# Patient Record
Sex: FEMALE | Race: WHITE
[De-identification: names, ages, dates, MRNs, and addresses within clinical notes are randomized per-mention and may not be internally consistent; named-entity substitution may affect disease eponyms.]

---

## 2018-06-04 ENCOUNTER — HOSPITAL ENCOUNTER (OUTPATIENT)
Dept: HOSPITAL 62 - SP | Age: 63
End: 2018-06-04
Attending: PODIATRIST
Payer: COMMERCIAL

## 2018-06-04 DIAGNOSIS — L97.512: Primary | ICD-10-CM

## 2018-06-04 PROCEDURE — 93922 UPR/L XTREMITY ART 2 LEVELS: CPT

## 2018-06-04 PROCEDURE — 93925 LOWER EXTREMITY STUDY: CPT

## 2019-03-21 ENCOUNTER — HOSPITAL ENCOUNTER (OUTPATIENT)
Dept: HOSPITAL 62 - WC | Age: 64
End: 2019-03-21
Attending: PODIATRIST
Payer: COMMERCIAL

## 2019-03-21 DIAGNOSIS — L97.512: Primary | ICD-10-CM

## 2019-03-21 NOTE — RADIOLOGY REPORT (SQ)
EXAM DESCRIPTION:  FOOT RIGHT COMPLETE



COMPLETED DATE/TIME:  3/21/2019 11:02 am



REASON FOR STUDY:  NON-PRS CHRONIC ULCER OTH PRT RIGHT FOOT W FAT LAYER EXPOSED L97.512  NON-PRS 
BRANDON ULCER OTH PRT RIGHT FOOT W FAT LAYER



COMPARISON:  None.



NUMBER OF VIEWS:  Three views.



TECHNIQUE:  AP, lateral and oblique  radiographic images acquired of the right foot.



LIMITATIONS:  None.



FINDINGS:  MINERALIZATION: Normal.

BONES: No lytic defects to suggest osteomyelitis.  No acute findings.

JOINTS: No effusions.

SOFT TISSUES: No radiopaque foreign body.  No air within the soft tissue.

OTHER: No other significant finding.



IMPRESSION:  No significant soft tissue or bony abnormalities associated with the chronic ulcer.



TECHNICAL DOCUMENTATION:  JOB ID:  [id number]

 2011 [employer]- All Rights Reserved



Reading location - IP/workstation name: [email or web address]

## 2019-10-10 ENCOUNTER — HOSPITAL ENCOUNTER (INPATIENT)
Dept: HOSPITAL 62 - ER | Age: 64
LOS: 7 days | Discharge: ANOTHER HOSPITAL | DRG: 255 | End: 2019-10-17
Attending: INTERNAL MEDICINE | Admitting: INTERNAL MEDICINE
Payer: COMMERCIAL

## 2019-10-10 ENCOUNTER — HOSPITAL ENCOUNTER (OUTPATIENT)
Dept: HOSPITAL 62 - OD | Age: 64
End: 2019-10-10
Attending: PODIATRIST
Payer: COMMERCIAL

## 2019-10-10 DIAGNOSIS — I35.0: ICD-10-CM

## 2019-10-10 DIAGNOSIS — Z79.82: ICD-10-CM

## 2019-10-10 DIAGNOSIS — E11.52: Primary | ICD-10-CM

## 2019-10-10 DIAGNOSIS — I45.2: ICD-10-CM

## 2019-10-10 DIAGNOSIS — Z79.01: ICD-10-CM

## 2019-10-10 DIAGNOSIS — M77.32: Primary | ICD-10-CM

## 2019-10-10 DIAGNOSIS — Z79.899: ICD-10-CM

## 2019-10-10 DIAGNOSIS — B96.4: ICD-10-CM

## 2019-10-10 DIAGNOSIS — E78.00: ICD-10-CM

## 2019-10-10 DIAGNOSIS — Z88.8: ICD-10-CM

## 2019-10-10 DIAGNOSIS — A48.0: ICD-10-CM

## 2019-10-10 DIAGNOSIS — E66.9: ICD-10-CM

## 2019-10-10 DIAGNOSIS — I10: ICD-10-CM

## 2019-10-10 DIAGNOSIS — E78.5: ICD-10-CM

## 2019-10-10 DIAGNOSIS — B95.1: ICD-10-CM

## 2019-10-10 DIAGNOSIS — Z79.4: ICD-10-CM

## 2019-10-10 DIAGNOSIS — L97.522: ICD-10-CM

## 2019-10-10 DIAGNOSIS — I48.19: ICD-10-CM

## 2019-10-10 LAB
ABSOLUTE LYMPHOCYTES# (MANUAL): 0.4 10^3/UL (ref 0.5–4.7)
ABSOLUTE MONOCYTES # (MANUAL): 0.8 10^3/UL (ref 0.1–1.4)
ADD MANUAL DIFF: YES
ALBUMIN SERPL-MCNC: 4.3 G/DL (ref 3.5–5)
ALP SERPL-CCNC: 91 U/L (ref 38–126)
ANION GAP SERPL CALC-SCNC: 16 MMOL/L (ref 5–19)
ANISOCYTOSIS BLD QL SMEAR: (no result)
APPEARANCE UR: (no result)
APTT PPP: YELLOW S
AST SERPL-CCNC: 17 U/L (ref 14–36)
BASE EXCESS BLDV CALC-SCNC: -0.4 MMOL/L
BASOPHILS NFR BLD MANUAL: 0 % (ref 0–2)
BILIRUB DIRECT SERPL-MCNC: 0.2 MG/DL (ref 0–0.4)
BILIRUB SERPL-MCNC: 1 MG/DL (ref 0.2–1.3)
BILIRUB UR QL STRIP: NEGATIVE
BUN SERPL-MCNC: 21 MG/DL (ref 7–20)
CALCIUM: 9.9 MG/DL (ref 8.4–10.2)
CHLORIDE SERPL-SCNC: 97 MMOL/L (ref 98–107)
CK MB SERPL-MCNC: 0.56 NG/ML (ref ?–4.55)
CO2 SERPL-SCNC: 25 MMOL/L (ref 22–30)
EOSINOPHIL NFR BLD MANUAL: 0 % (ref 0–6)
ERYTHROCYTE [DISTWIDTH] IN BLOOD BY AUTOMATED COUNT: 16.1 % (ref 11.5–14)
GLUCOSE SERPL-MCNC: 125 MG/DL (ref 75–110)
GLUCOSE UR STRIP-MCNC: >=500 MG/DL
HCO3 BLDV-SCNC: 23.8 MMOL/L (ref 20–32)
HCT VFR BLD CALC: 40.4 % (ref 36–47)
HGB BLD-MCNC: 12.9 G/DL (ref 12–15.5)
INR PPP: 1.2
KETONES UR STRIP-MCNC: 20 MG/DL
MCH RBC QN AUTO: 25.9 PG (ref 27–33.4)
MCHC RBC AUTO-ENTMCNC: 31.8 G/DL (ref 32–36)
MCV RBC AUTO: 81 FL (ref 80–97)
MONOCYTES % (MANUAL): 4 % (ref 3–13)
NEUTS BAND NFR BLD MANUAL: 2 % (ref 3–5)
NITRITE UR QL STRIP: NEGATIVE
PCO2 BLDV: 37.6 MMHG (ref 35–63)
PH BLDV: 7.42 [PH] (ref 7.3–7.42)
PH UR STRIP: 5 [PH] (ref 5–9)
PLATELET # BLD: 335 10^3/UL (ref 150–450)
PLATELET COMMENT: ADEQUATE
POTASSIUM SERPL-SCNC: 4.6 MMOL/L (ref 3.6–5)
PROT SERPL-MCNC: 7.9 G/DL (ref 6.3–8.2)
PROT UR STRIP-MCNC: 100 MG/DL
PROTHROMBIN TIME: 15.3 SEC (ref 11.4–15.4)
RBC # BLD AUTO: 4.97 10^6/UL (ref 3.72–5.28)
SEGMENTED NEUTROPHILS % (MAN): 92 % (ref 42–78)
SP GR UR STRIP: 1.03
TOTAL CELLS COUNTED BLD: 100
TROPONIN I SERPL-MCNC: 0.03 NG/ML
UROBILINOGEN UR-MCNC: NEGATIVE MG/DL (ref ?–2)
VARIANT LYMPHS NFR BLD MANUAL: 2 % (ref 13–45)
WBC # BLD AUTO: 20.8 10^3/UL (ref 4–10.5)

## 2019-10-10 PROCEDURE — 75635 CT ANGIO ABDOMINAL ARTERIES: CPT

## 2019-10-10 PROCEDURE — 82553 CREATINE MB FRACTION: CPT

## 2019-10-10 PROCEDURE — 87075 CULTR BACTERIA EXCEPT BLOOD: CPT

## 2019-10-10 PROCEDURE — 84443 ASSAY THYROID STIM HORMONE: CPT

## 2019-10-10 PROCEDURE — 93306 TTE W/DOPPLER COMPLETE: CPT

## 2019-10-10 PROCEDURE — 83605 ASSAY OF LACTIC ACID: CPT

## 2019-10-10 PROCEDURE — 0Y6Q0Z0 DETACHMENT AT LEFT 1ST TOE, COMPLETE, OPEN APPROACH: ICD-10-PCS | Performed by: SURGERY

## 2019-10-10 PROCEDURE — 93010 ELECTROCARDIOGRAM REPORT: CPT

## 2019-10-10 PROCEDURE — 82962 GLUCOSE BLOOD TEST: CPT

## 2019-10-10 PROCEDURE — 71046 X-RAY EXAM CHEST 2 VIEWS: CPT

## 2019-10-10 PROCEDURE — 87040 BLOOD CULTURE FOR BACTERIA: CPT

## 2019-10-10 PROCEDURE — 88305 TISSUE EXAM BY PATHOLOGIST: CPT

## 2019-10-10 PROCEDURE — 80053 COMPREHEN METABOLIC PANEL: CPT

## 2019-10-10 PROCEDURE — 93926 LOWER EXTREMITY STUDY: CPT

## 2019-10-10 PROCEDURE — 82565 ASSAY OF CREATININE: CPT

## 2019-10-10 PROCEDURE — 87070 CULTURE OTHR SPECIMN AEROBIC: CPT

## 2019-10-10 PROCEDURE — 99285 EMERGENCY DEPT VISIT HI MDM: CPT

## 2019-10-10 PROCEDURE — 84439 ASSAY OF FREE THYROXINE: CPT

## 2019-10-10 PROCEDURE — 82550 ASSAY OF CK (CPK): CPT

## 2019-10-10 PROCEDURE — 82803 BLOOD GASES ANY COMBINATION: CPT

## 2019-10-10 PROCEDURE — 01480 ANES OPEN PX LOWER L/A/F NOS: CPT

## 2019-10-10 PROCEDURE — 87186 SC STD MICRODIL/AGAR DIL: CPT

## 2019-10-10 PROCEDURE — 83880 ASSAY OF NATRIURETIC PEPTIDE: CPT

## 2019-10-10 PROCEDURE — 80202 ASSAY OF VANCOMYCIN: CPT

## 2019-10-10 PROCEDURE — 84481 FREE ASSAY (FT-3): CPT

## 2019-10-10 PROCEDURE — 96365 THER/PROPH/DIAG IV INF INIT: CPT

## 2019-10-10 PROCEDURE — 81001 URINALYSIS AUTO W/SCOPE: CPT

## 2019-10-10 PROCEDURE — 87086 URINE CULTURE/COLONY COUNT: CPT

## 2019-10-10 PROCEDURE — 84484 ASSAY OF TROPONIN QUANT: CPT

## 2019-10-10 PROCEDURE — 83735 ASSAY OF MAGNESIUM: CPT

## 2019-10-10 PROCEDURE — 83036 HEMOGLOBIN GLYCOSYLATED A1C: CPT

## 2019-10-10 PROCEDURE — 93005 ELECTROCARDIOGRAM TRACING: CPT

## 2019-10-10 PROCEDURE — 80061 LIPID PANEL: CPT

## 2019-10-10 PROCEDURE — 36415 COLL VENOUS BLD VENIPUNCTURE: CPT

## 2019-10-10 PROCEDURE — 85025 COMPLETE CBC W/AUTO DIFF WBC: CPT

## 2019-10-10 PROCEDURE — 85610 PROTHROMBIN TIME: CPT

## 2019-10-10 PROCEDURE — 87205 SMEAR GRAM STAIN: CPT

## 2019-10-10 PROCEDURE — 85027 COMPLETE CBC AUTOMATED: CPT

## 2019-10-10 PROCEDURE — 87077 CULTURE AEROBIC IDENTIFY: CPT

## 2019-10-10 PROCEDURE — 88311 DECALCIFY TISSUE: CPT

## 2019-10-10 RX ADMIN — PIPERACILLIN AND TAZOBACTAM SCH MLS/HR: 3; .375 INJECTION, POWDER, LYOPHILIZED, FOR SOLUTION INTRAVENOUS; PARENTERAL at 16:35

## 2019-10-10 RX ADMIN — Medication SCH: at 21:56

## 2019-10-10 RX ADMIN — INSULIN HUMAN SCH: 100 INJECTION, SOLUTION PARENTERAL at 21:56

## 2019-10-10 RX ADMIN — SODIUM CHLORIDE PRN MLS/HR: 9 INJECTION, SOLUTION INTRAVENOUS at 16:20

## 2019-10-10 RX ADMIN — PANTOPRAZOLE SODIUM SCH: 40 TABLET, DELAYED RELEASE ORAL at 16:29

## 2019-10-10 RX ADMIN — INSULIN HUMAN SCH: 100 INJECTION, SOLUTION PARENTERAL at 16:48

## 2019-10-10 RX ADMIN — VANCOMYCIN HYDROCHLORIDE SCH MLS/HR: 1 INJECTION, POWDER, LYOPHILIZED, FOR SOLUTION INTRAVENOUS at 21:00

## 2019-10-10 RX ADMIN — PIPERACILLIN AND TAZOBACTAM SCH: 3; .375 INJECTION, POWDER, LYOPHILIZED, FOR SOLUTION INTRAVENOUS; PARENTERAL at 21:50

## 2019-10-10 NOTE — PDOC H&P
History of Present Illness


Admission Date/PCP: 


  10/10/19 13:44





  MAITE PABLO MD





Patient complains of: Left lower leg cellulitis


History of Present Illness: 


YARA CARPENTER is a 64 year old female history of type 2 diabetes mellitus, 

hypertension, hypercholesteremia, left carotid endarterectomy came to the 

emergency room with complaints of left lower leg/left foot erythema and redness 

and ulceration.  She went to see her pediatrician for callus removal as an 

outpatient and minor procedure was done on the left foot started having the 

redness increasing pain from yesterday decided to came to the emergency room for

further evaluation.  She has a fever of 103 in the emergency room with a WBC 

count of 20,000.  X-rays done this morning with a different account shows gas 

formation in the right foot.  Consultation with Dr. Bailey is requested and 

also found to have new onset atrial fibrillation in the emergency room 

consultation with Dr. Ferrera as requested.








Past Medical History


Cardiac Medical History: Reports: Hyperlipidema, Hypertension


Endocrine Medical History: Reports: Diabetes Mellitus Type 2





Past Surgical History


Past Surgical History: Reports: Vascular Surgery - Left carotid endarterectomy





Social History


Information Source: Patient


Smoking Status: Never Smoker


Electronic Cigarette use?: No





- Advance Directive


Resuscitation Status: Full Code





Family History


Family History: Reviewed & Not Pertinent


Parental Family History Reviewed: Yes - Both mom and sister has strokes.


Children Family History Reviewed: Yes


Sibling(s) Family History Reviewed.: Yes





Medication/Allergy


Home Medications: 








Aspirin [Aspirin 325 mg Tablet] 325 mg PO DAILY 10/10/19 


Empagliflozin [Jardiance] 10 mg PO DAILY 10/10/19 


Insulin Aspart [Novolog Flexpen] 0 units SQ .PERSLIDINGSCALE 10/10/19 


Insulin Degludec [Tresiba Flextouch U-200] 45 units SQ DAILY 10/10/19 


Losartan Potassium [Cozaar 25 mg Tablet] 25 mg PO DAILY 10/10/19 


Metformin HCl [Glucophage] 1,000 mg PO WBRKFST 10/10/19 


Saxagliptin HCl/Metformin HCl [Kombiglyze Xr 5-1,000 mg Tab] 1 tab PO WSUPPER 1

0/10/19 


Simvastatin [Zocor 80 mg Tablet] 80 mg PO QHS 10/10/19 








Allergies/Adverse Reactions: 


                                        





glipizide [From Glucotrol] Allergy (Verified 10/10/19 10:43)


   











Review of Systems


Constitutional: PRESENT: fatigue, fever(s), weakness.  ABSENT: headache(s), 

night sweats


Eyes: ABSENT: visual disturbances


Ears: ABSENT: hearing changes


Nose, Mouth, and Throat: ABSENT: sore throat


Cardiovascular: ABSENT: orthropnea, palpitations


Respiratory: ABSENT: dyspnea, hemoptysis


Gastrointestinal: ABSENT: coffee ground emesis, diarrhea, dysphagia, hematemesis


Genitourinary: ABSENT: dysuria, hematuria


Musculoskeletal: PRESENT: joint swelling, other - Erythema and swelling of the 

left foot present.


Neurological: ABSENT: abnormal gait, abnormal speech, confusion, dizziness, 

focal weakness, syncope


Psychiatric: ABSENT: anxiety, depression, homidical ideation, suicidal ideation





Physical Exam


Vital Signs: 


                                        











Temp Pulse Resp BP Pulse Ox


 


 98.3 F   104 H  25 H  151/58 H  94 


 


 10/10/19 13:00  10/10/19 10:27  10/10/19 14:02  10/10/19 14:02  10/10/19 14:02








                                 Intake & Output











 10/09/19 10/10/19 10/11/19





 06:59 06:59 06:59


 


Weight   86.4 kg











General appearance: PRESENT: mild distress, obese


Head exam: PRESENT: atraumatic


Eye exam: PRESENT: PERRLA


Mouth exam: PRESENT: moist, neck supple, tongue midline


Teeth exam: PRESENT: poor dentation


Neck exam: ABSENT: carotid bruit, JVD, lymphadenopathy, thyromegaly


Respiratory exam: PRESENT: decreased breath sounds


Cardiovascular exam: PRESENT: irregular rhythm, tachycardia


GI/Abdominal exam: PRESENT: normal bowel sounds, soft.  ABSENT: distended, 

guarding, mass, organolmegaly, rebound, tenderness


Rectal exam: PRESENT: deferred


Extremities exam: PRESENT: full ROM.  ABSENT: calf tenderness, clubbing, pedal 

edema


Neurological exam: PRESENT: alert, awake, oriented to person, oriented to place,

oriented to time, oriented to situation, CN II-XII grossly intact.  ABSENT: 

motor sensory deficit


Psychiatric exam: PRESENT: appropriate affect, normal mood.  ABSENT: homicidal 

ideation, suicidal ideation





Results


Laboratory Results: 


                                        





                                 10/10/19 11:55 





                                 10/10/19 11:55 





                                        











  10/10/19 10/10/19 10/10/19





  11:55 11:55 11:55


 


WBC  20.8 H  


 


RBC  4.97  


 


Hgb  12.9  


 


Hct  40.4  


 


MCV  81  


 


MCH  25.9 L  


 


MCHC  31.8 L  


 


RDW  16.1 H  


 


Plt Count  335  


 


Seg Neutrophils %  Not Reportable  


 


VBG pH   


 


VBG pCO2   


 


VBG HCO3   


 


VBG Base Excess   


 


Sodium   137.8 


 


Potassium   4.6 


 


Chloride   97 L 


 


Carbon Dioxide   25 


 


Anion Gap   16 


 


BUN   21 H 


 


Creatinine   0.99 


 


Est GFR ( Amer)   > 60 


 


Glucose   125 H 


 


Lactic Acid    2.4 H


 


Calcium   9.9 


 


Total Bilirubin   1.0 


 


AST   17 


 


Alkaline Phosphatase   91 


 


Total Protein   7.9 


 


Albumin   4.3 














  10/10/19





  11:55


 


WBC 


 


RBC 


 


Hgb 


 


Hct 


 


MCV 


 


MCH 


 


MCHC 


 


RDW 


 


Plt Count 


 


Seg Neutrophils % 


 


VBG pH  7.42


 


VBG pCO2  37.6


 


VBG HCO3  23.8


 


VBG Base Excess  -0.4


 


Sodium 


 


Potassium 


 


Chloride 


 


Carbon Dioxide 


 


Anion Gap 


 


BUN 


 


Creatinine 


 


Est GFR (African Amer) 


 


Glucose 


 


Lactic Acid 


 


Calcium 


 


Total Bilirubin 


 


AST 


 


Alkaline Phosphatase 


 


Total Protein 


 


Albumin 














Assessment and Plan





- Diagnosis


(1) Diabetic infection of left foot


Is this a current diagnosis for this admission?: Yes   


Plan: 


10/10/2019-patient is going to be admitted to [facility] as an inpatient.  To start 

her on IV vancomycin and Zosyn blood cultures wound cultures are requested.  MRI

of the left lower extremity was requested.  ID consult was requested.  Surgical 

consult was requested.  Started on morphine 1 mg IV every 4 as needed.  GI 

prophylaxis initiated.  DVT prophylaxis with heparin 5000 units every 8 hours 

requested.  To start her on insulin sliding scale.  Place a diabetic diet.  

Physical therapy consult on  consult is requested.








(2) Fever


Qualifiers: 


   Fever type: unspecified   Qualified Code(s): R50.9 - Fever, unspecified   


Is this a current diagnosis for this admission?: Yes   


Plan: 


10/10/2019-patient came in with fever of 103.  Due to left foot cellulitis.  

Started on vancomycin and Zosyn, blood cultures urine cultures wound cultures 

are requested.








(3) Leukocytosis


Qualifiers: 


   Leukocytosis type: bandemia   Qualified Code(s): D72.825 - Bandemia   


Is this a current diagnosis for this admission?: Yes   


Plan: 


10/10/2019-high WBC count most likely secondary to left foot cellulitis with 

underlying possible osteomyelitis.  Started on vancomycin and Zosyn cultures are

requested.  MRI of the left lower extremity was requested to rule out 

osteomyelitis.








(4) New onset atrial fibrillation


Is this a current diagnosis for this admission?: Yes   


Plan: 


10/10/2019-patient found to have new onset atrial fibrillation rate controlled. 

Consult patient with Dr. Ferrera is requested.  Possibly she need Eliquis 

down the line.








(5) HTN (hypertension)


Is this a current diagnosis for this admission?: No   


Plan: 


Patient has history of chronic essential hypertension.  She is on Cozaar at 

home.  Plan is to resume the medication during the hospital stay.  Latest blood 

pressure is 151/60.








(6) Obesity (BMI 30.0-34.9)


Is this a current diagnosis for this admission?: No   


Plan: 


10/10/2019-patient BMI is more than 31 diet exercise weight loss lifestyle 

modifications are discussed with the patient.

## 2019-10-10 NOTE — ER DOCUMENT REPORT
Entered by FRANKIE PATEL SCRIBE  10/10/19 1123 





Acting as scribe for:MINDY JAIME MD





ED Extremity Problem, Lower





- General


Chief Complaint: Foot Pain


Stated Complaint: LEFT FOOT PAIN, SWELLING


Time Seen by Provider: 10/10/19 11:07


Primary Care Provider: 


NISA GOFF DPM [ACTIVE STAFF] - Follow up as needed


Mode of Arrival: Wheelchair


Information source: Patient


Notes: 





Patient is a 64-year-old female that presents to the emergency department today 

from the wound care clinic for concerns of a left foot wound infection.  Patient

complains of subjective fevers as well.  Patient states that she had a callus 

removed from the plantar surface of her left foot  2 days ago, went for a 

follow-up today and wound care was concerned because of the drainage and 

appearance of this wound.


TRAVEL OUTSIDE OF THE [place] IN LAST 30 DAYS: No





- Related Data


Allergies/Adverse Reactions: 


                                        





glipizide [From Glucotrol] Allergy (Verified 10/10/19 10:43)


   











Past Medical History





- General


Information source: Patient, [facility] Records





- Social History


Smoking Status: Never Smoker


Cigarette use (# per day): No


Chew tobacco use (# tins/day): No


Frequency of alcohol use: None


Drug Abuse: None


Family History: Reviewed & Not Pertinent


Patient has suicidal ideation: No


Patient has homicidal ideation: No





- Past Medical History


Cardiac Medical History: Reports: Hx Hypercholesterolemia, Hx Hypertension


Endocrine Medical History: Reports: Hx Diabetes Mellitus Type 2


Past Surgical History: Reports: Hx Vascular Surgery - Left carotid 

endarterectomy





Review of Systems





- Review of Systems


Constitutional: See HPI, Fever - subjective


EENT: No symptoms reported


Cardiovascular: No symptoms reported


Respiratory: No symptoms reported


Gastrointestinal: No symptoms reported


Genitourinary: No symptoms reported


Female Genitourinary: No symptoms reported


Musculoskeletal: No symptoms reported


Skin: See HPI, Lesions


Hematologic/Lymphatic: No symptoms reported


Neurological/Psychological: No symptoms reported


-: Yes All other systems reviewed and negative





Physical Exam





- Vital signs


Vitals: 


                                        











Temp Pulse Resp BP Pulse Ox


 


 98.9 F   104 H  18   182/55 H  95 


 


 10/10/19 10:27  10/10/19 10:27  10/10/19 10:27  10/10/19 10:27  10/10/19 10:27














- Notes


Notes: 





Physical Exam:


 


General: Alert, appears well. 


 


HEENT: Normocephalic. Atraumatic. PERRL. Extraocular movements intact. O

ropharynx clear.


 


Neck: Supple. Non-tender.


 


Respiratory: No respiratory distress. Clear and equal breath sounds bilaterally.


 


Cardiovascular: Irregularly irregular. 





Abdominal: Obese. Non-tender. No distension. Normal Bowel Sounds. 


 


Back: No gross abnormalities. 


 


Extremities: Moves all four extremities.


Upper extremities: Normal inspection. Normal ROM.  


Lower extremities: Left foot is grossly puffy and swollen.  Foot is warm to the 

touch.  There is erythema, ecchymosis, and swelling at the base of the first 

metacarpal head on the plantar surface.  Deep wound that is draining.


 


Neurological: Normal cognition. AAOx4. Normal speech.  


 


Psychological: Normal affect. Normal Mood. 


 


Skin: see lower extremity exam





Course





- Vital Signs


Vital signs: 


                                        











Temp Pulse Resp BP Pulse Ox


 


 98.3 F   104 H  20   137/59 H  93 


 


 10/10/19 13:00  10/10/19 10:27  10/10/19 13:02  10/10/19 13:02  10/10/19 13:02














- Laboratory


Result Diagrams: 


                                 10/10/19 11:55





                                 10/10/19 11:55


Laboratory results interpreted by me: 


                                        











  10/10/19 10/10/19 10/10/19





  11:55 11:55 11:55


 


WBC  20.8 H  


 


MCH  25.9 L  


 


MCHC  31.8 L  


 


RDW  16.1 H  


 


Seg Neuts % (Manual)  92 H  


 


Band Neutrophils %  2 L  


 


Lymphocytes % (Manual)  2 L  


 


Abs Neuts (Manual)  19.6 H  


 


Abs Lymphs (Manual)  0.4 L  


 


Chloride   97 L 


 


BUN   21 H 


 


Est GFR (MDRD) Non-Af   57 L 


 


Glucose   125 H 


 


Lactic Acid    2.4 H














- Diagnostic Test


Radiology reviewed: Image reviewed, Reports reviewed - Left foot x-ray shows 

swelling of the forefoot with gas in the soft tissues.  There is no definite 

involvement of the bone.





- EKG Interpretation by Me


EKG shows normal: Axis, Intervals, QRS Complexes, ST-T Waves


Rate: Tachycardia - 101


Rhythm: A.Fib


Axis/QRS: RBBB


When compared to previous EKG there are: Changes noted - New onset atrial 

fibrillation





- Consults


  ** Dr. Maria


Consulted provider: will come to ER





Discharge





- Discharge


Clinical Impression: 


 Diabetic infection of left foot, Diabetic peripheral neuropathy, New onset 

atrial fibrillation





Leukocytosis


Qualifiers:


 Leukocytosis type: bandemia Qualified Code(s): D72.825 - Bandemia





Fever


Qualifiers:


 Fever type: unspecified Qualified Code(s): R50.9 - Fever, unspecified





Condition: Stable


Disposition: ADMITTED AS INPATIENT


Admitting Provider: Candace (Hospitalist)


Unit Admitted: IMCU


Referrals: 


NISA GOFF DPM [ACTIVE STAFF] - Follow up as needed


Scribe Attestation: 





10/10/19 11:45


I personally performed the services described in the documentation, reviewed and

edited the documentation which was dictated to the scribe in my presence, and it

accurately records my words and actions.





I personally performed the services described in the documentation, reviewed and

edited the documentation which was dictated to the scribe in my presence, and it

accurately records my words and actions.

## 2019-10-10 NOTE — OPERATIVE REPORT
Operative Report


DATE OF SURGERY: 10/10/19


PREOPERATIVE DIAGNOSIS: Septic, diabetic left foot, with gas gangrene and infec

anna mal perforans ulcer


POSTOPERATIVE DIAGNOSIS: Same with extensive skin, soft tissue, fascia, and bone

involvement


OPERATION: Aggressive left foot debridement including complete amputation of 

left great toe, and webspace skin, soft tissue and tendons


SURGEON: YEISON RICHARD


ANESTHESIA: LMAC


TISSUE REMOVED OR ALTERED: Skin, bone, fascia pus left foot


COMPLICATIONS: 





None


ESTIMATED BLOOD LOSS: 25 cc 


INTRAOPERATIVE FINDINGS: See below


PROCEDURE: 





The patient was taken to the main operating room where LMAC anesthesia was 

induced.  Left foot was prepped and draped in sterile fashion.





Surgical plan surgical timeout were conducted.





Findings were significant for necrotic mal perforans ulcer of the left first 

metatarsal head.  There was nonviable skin on the dorsum of the first webspace. 

An aggressive amputation of the left great toe was now performed, removing the 

entire toe, and a wedge of skin on the dorsum of the foot over the first 

webspace.  All of the underlying tissue was necrotic, gray to black with pus.  

Pus was sent for Gram stain culture and sensitivity.  The second toe was 

preserved released at this time although it may require amputation pending 

clinical course.  The wound was vigorously irrigated with saline.  The first mal

perforans ulcer on the plantar surface of the foot was excised in its entirety. 

We were left with a large open wound, with some degree of bleeding.  Bleeders 

were cauterized as encountered.  The first metatarsal head was amputated with 

rongeurs.  We felt that the initial source control,damage control type operation

had been  performed.  The wound was again irrigated, then checked for bleeding. 

There is no mechanical bleeding.  Patient remained hemodynamically stable.  

Surgicel was placed in all of the crevices of the wound, then Xeroform 4 x 4's 

Kerlix and Ace wrap applied.  Patient our procedure well, taken recovery in 

stable condition.

## 2019-10-10 NOTE — PDOC CONSULTATION
Consultation


Consult Date: 10/10/19


Attending physician:: MARIEL MACIAS


Provider Consulted: YEISON RICHARD


Consult reason:: Septic left foot





History of Present Illness


Admission Date/PCP: 


  10/10/19 13:44





  MAITE PABLO MD





History of Present Illness: 


YARA CARPENTER is a 64 year old female


Presents to the emergency department complaining of several day history of left 

foot swelling, erythema, and drainage.  Patient is well-known to the wound 

clinic, having at Dr. Nicole, podiatry, excisionally debride left first mal 

perforans ulcer on multiple occasions in the [facility] wound [place].  Patient is 

seen in the emergency department where she has a red swollen erythematous angry 

left foot, dorsal aspect with violaceous changes over the left first webspace.  

White blood cell count 20,000.  X-ray of foot shows gas in the subcutaneous 

tissues.  Patient was admitted to the hospital service with surgery consulting. 

Foot was assessed and felt to require operative debridement.





Past Medical History


Cardiac Medical History: Reports: Hyperlipidema, Hypertension


Endocrine Medical History: Reports: Diabetes Mellitus Type 2





Past Surgical History


Past Surgical History: 





Diabetic left foot


Past Surgical History: Reports: Vascular Surgery - Left carotid endarterectomy





Social History


Smoking Status: Never Smoker


Electronic Cigarette use?: No


Frequency of Alcohol Use: Rare


Hx Recreational Drug Use: No





- Advance Directive


Resuscitation Status: Full Code





Family History


Family History: None, Reviewed & Not Pertinent


Parental Family History Reviewed: No


Children Family History Reviewed: No


Sibling(s) Family History Reviewed.: No





Medication/Allergy


Home Medications: 








Aspirin [Aspirin 325 mg Tablet] 325 mg PO DAILY 10/10/19 


Empagliflozin [Jardiance] 10 mg PO DAILY 10/10/19 


Insulin Aspart [Novolog Flexpen] 0 units SQ .PERSLIDINGSCALE 10/10/19 


Insulin Degludec [Tresiba Flextouch U-200] 45 units SQ DAILY 10/10/19 


Losartan Potassium [Cozaar 25 mg Tablet] 25 mg PO DAILY 10/10/19 


Metformin HCl [Glucophage] 1,000 mg PO WBRKFST 10/10/19 


Saxagliptin HCl/Metformin HCl [Kombiglyze Xr 5-1,000 mg Tab] 1 tab PO WSUPPER 

10/10/19 


Simvastatin [Zocor 80 mg Tablet] 80 mg PO QHS 10/10/19 








Allergies/Adverse Reactions: 


                                        





glipizide [From Glucotrol] Allergy (Verified 10/10/19 10:43)


   











Review of Systems


Constitutional: PRESENT: as per HPI


Eyes: ABSENT: visual disturbances


Ears: ABSENT: hearing changes


Genitourinary: ABSENT: dysuria, hematuria


Musculoskeletal: PRESENT: as per HPI


Psychiatric: ABSENT: anxiety, depression, homidical ideation, suicidal ideation


Endocrine: ABSENT: cold intolerance, heat intolerance, polydipsia, polyuria


Hematologic/Lymphatic: ABSENT: easy bleeding, easy bruising





Physical Exam


Vital Signs: 


                                        











Temp Pulse Resp BP Pulse Ox


 


 98.8 F   104 H  19   124/57 L  93 


 


 10/10/19 15:18  10/10/19 10:27  10/10/19 15:18  10/10/19 15:18  10/10/19 15:18








                                 Intake & Output











 10/09/19 10/10/19 10/11/19





 06:59 06:59 06:59


 


Weight   86.4 kg











General appearance: PRESENT: no acute distress


Head exam: PRESENT: normocephalic


Mouth exam: PRESENT: dry mucosa


Respiratory exam: PRESENT: rhonchi


Cardiovascular exam: PRESENT: RRR


Pulses: PRESENT: other - She has palpable femoral, and popliteal pulses; I am 

unable to feel pulses in the feet.


Extremities exam: PRESENT: other - Bilateral pedal edema.  Left foot examined.  

Chronic onychomycoses of all nails.  Moderate to large mal perforans ulcer left 

first metatarsal head; erythema edema of the dorsum of the foot with intense 

violaceous changes to the skin overlying the first second webspace


Neurological exam: PRESENT: oriented to person, oriented to place, oriented to 

time, oriented to situation





Results


Laboratory Results: 


                                        





                                 10/10/19 11:55 





                                 10/10/19 11:55 





                                        











  10/10/19 10/10/19 10/10/19





  11:55 11:55 11:55


 


WBC  20.8 H  


 


RBC  4.97  


 


Hgb  12.9  


 


Hct  40.4  


 


MCV  81  


 


MCH  25.9 L  


 


MCHC  31.8 L  


 


RDW  16.1 H  


 


Plt Count  335  


 


Seg Neutrophils %  Not Reportable  


 


VBG pH   


 


VBG pCO2   


 


VBG HCO3   


 


VBG Base Excess   


 


Sodium   137.8 


 


Potassium   4.6 


 


Chloride   97 L 


 


Carbon Dioxide   25 


 


Anion Gap   16 


 


BUN   21 H 


 


Creatinine   0.99 


 


Est GFR ( Amer)   > 60 


 


Glucose   125 H 


 


Lactic Acid    2.4 H


 


Calcium   9.9 


 


Total Bilirubin   1.0 


 


AST   17 


 


Alkaline Phosphatase   91 


 


Total Protein   7.9 


 


Albumin   4.3 


 


Urine Color   


 


Urine Appearance   


 


Urine pH   


 


Ur Specific Gravity   


 


Urine Protein   


 


Urine Glucose (UA)   


 


Urine Ketones   


 


Urine Blood   


 


Urine Nitrite   


 


Ur Leukocyte Esterase   


 


Urine WBC (Auto)   


 


Urine RBC (Auto)   














  10/10/19 10/10/19





  11:55 15:20


 


WBC  


 


RBC  


 


Hgb  


 


Hct  


 


MCV  


 


MCH  


 


MCHC  


 


RDW  


 


Plt Count  


 


Seg Neutrophils %  


 


VBG pH  7.42 


 


VBG pCO2  37.6 


 


VBG HCO3  23.8 


 


VBG Base Excess  -0.4 


 


Sodium  


 


Potassium  


 


Chloride  


 


Carbon Dioxide  


 


Anion Gap  


 


BUN  


 


Creatinine  


 


Est GFR (African Amer)  


 


Glucose  


 


Lactic Acid  


 


Calcium  


 


Total Bilirubin  


 


AST  


 


Alkaline Phosphatase  


 


Total Protein  


 


Albumin  


 


Urine Color   YELLOW


 


Urine Appearance   SLIGHTLY-CLOUDY


 


Urine pH   5.0


 


Ur Specific Gravity   1.030


 


Urine Protein   100 H


 


Urine Glucose (UA)   >=500 H


 


Urine Ketones   20 H


 


Urine Blood   SMALL H


 


Urine Nitrite   NEGATIVE


 


Ur Leukocyte Esterase   NEGATIVE


 


Urine WBC (Auto)   3


 


Urine RBC (Auto)   2











Impressions: 


                                        





Chest X-Ray  10/10/19 14:34


IMPRESSION:  NO ACUTE RADIOGRAPHIC FINDING IN THE CHEST.


 














Assessment & Plan





- Diagnosis


(1) Diabetic infection of left foot


Is this a current diagnosis for this admission?: Yes   


Plan: 


Impression: Septic left foot in diabetic status post serial debridements left 

first metatarsal head mal perforans ulcer with gas in the soft tissues with 

leukocytosis and elevated lactate level





Recommendations:





1.  Patient is to go to the operating room for a left foot debridement main 

operating room.  Serial debridements may be required.  This was explained to the

patient.





2.  We will set the procedure up for later today.  I am speaking with the 

anesthesiologist and primary care team about the plan.











(3) New onset atrial fibrillation


Is this a current diagnosis for this admission?: Yes   





- Time


Time Spent: 30 to 50 Minutes


Smoking Cessation Education: 3 to 10 minutes


Medications reviewed and adjusted accordingly: Yes


Anticipated discharge: Home





- Inpatient Certification


Based on my medical assessment, after consideration of the patient's 

comorbidities, presenting symptoms, or acuity I expect that the services needed 

warrant INPATIENT care.: Yes


I certify that my determination is in accordance with my understanding of 

Medicare's requirements for reasonable and necessary INPATIENT services [42 CFR 

412.3e].: Yes


Medical Necessity: Need For IV Fluids, Need for Pain Control, Need for IV 

Antibiotics, Need for Surgery

## 2019-10-10 NOTE — RADIOLOGY REPORT (SQ)
EXAM DESCRIPTION:  FOOT LEFT COMPLETE



COMPLETED DATE/TIME:  10/10/2019 9:21 am



REASON FOR STUDY:  NON-PRS CHRONIC ULCER OTH PRT LEFT FOOT W FAT LAYER EXPOSED L97.522  NON-PRS CHRON
IC ULCER OTH PRT LEFT FOOT W FAT LAYER



COMPARISON:  None.



NUMBER OF VIEWS:  Three views.



TECHNIQUE:  AP, lateral and oblique  radiographic images acquired of the left foot.



LIMITATIONS:  None.



FINDINGS:  MINERALIZATION: Normal.

BONES: No acute fracture or dislocation.  No worrisome bone lesions.  Chronic appearing periosteal ne
w bone along the lateral base of the 4th and 5th metatarsals could reflect healed fractures or healed
 osteomyelitis

JOINTS: No effusions.

SOFT TISSUES: There is forefoot soft tissue swelling.  Soft tissue gas is seen along the medial and d
orsal aspect of the 1st metatarsophalangeal joint.  There is a plantar ulcer along the 1st metatarsop
halangeal joint, treated with radiopaque ointment.

OTHER: Small plantar and dorsal calcaneal spurs.  Films discussed with Dr. Melvin



IMPRESSION:  Forefoot soft tissue gas and soft tissue swelling adjacent to the 1st metatarsophalangea
l joint

Plantar ulcer 1st metatarsophalangeal joint region

No aggressive bony demineralization at the 1st metatarsophalangeal joint



TECHNICAL DOCUMENTATION:  JOB ID:  [id number]

 2011 [employer]- All Rights Reserved



Reading location - IP/workstation name: [email or web address]

## 2019-10-10 NOTE — EKG REPORT
SEVERITY:- ABNORMAL ECG -

ATRIAL FIBRILLATION, V-RATE 

RIGHT BUNDLE BRANCH BLOCK

LATERAL INFARCT, OLD

NONSPECIFIC ST-T CHANGES- INFERIOR LEADS

:

Confirmed by: Richi Asher MD 10-Oct-2019 13:06:14

## 2019-10-10 NOTE — RADIOLOGY REPORT (SQ)
EXAM DESCRIPTION:  CHEST 2 VIEWS



COMPLETED DATE/TIME:  10/10/2019 3:00 pm



REASON FOR STUDY:  shortness of breath



COMPARISON:  None.



EXAM PARAMETERS:  NUMBER OF VIEWS: two views

TECHNIQUE: Digital Frontal and Lateral radiographic views of the chest acquired.

RADIATION DOSE: NA

LIMITATIONS: none



FINDINGS:  LUNGS AND PLEURA: No opacities, masses or pneumothorax. No pleural effusion.

MEDIASTINUM AND HILAR STRUCTURES: No masses or contour abnormalities.

HEART AND VASCULAR STRUCTURES: Heart normal size.  No evidence for failure.

BONES: No acute findings.

HARDWARE: None in the chest.

OTHER: No other significant finding.



IMPRESSION:  NO ACUTE RADIOGRAPHIC FINDING IN THE CHEST.



TECHNICAL DOCUMENTATION:  JOB ID:  [id number]

 2011 [employer]- All Rights Reserved



Reading location - IP/workstation name: [email or web address]

## 2019-10-11 LAB
ALBUMIN SERPL-MCNC: 3.4 G/DL (ref 3.5–5)
ALBUMIN SERPL-MCNC: 3.5 G/DL (ref 3.5–5)
ALP SERPL-CCNC: 69 U/L (ref 38–126)
ALP SERPL-CCNC: 69 U/L (ref 38–126)
ANION GAP SERPL CALC-SCNC: 11 MMOL/L (ref 5–19)
ANION GAP SERPL CALC-SCNC: 9 MMOL/L (ref 5–19)
AST SERPL-CCNC: 17 U/L (ref 14–36)
AST SERPL-CCNC: 20 U/L (ref 14–36)
BILIRUB DIRECT SERPL-MCNC: 0.2 MG/DL (ref 0–0.4)
BILIRUB DIRECT SERPL-MCNC: 0.2 MG/DL (ref 0–0.4)
BILIRUB SERPL-MCNC: 0.5 MG/DL (ref 0.2–1.3)
BILIRUB SERPL-MCNC: 0.6 MG/DL (ref 0.2–1.3)
BUN SERPL-MCNC: 31 MG/DL (ref 7–20)
BUN SERPL-MCNC: 32 MG/DL (ref 7–20)
CALCIUM: 8.6 MG/DL (ref 8.4–10.2)
CALCIUM: 8.8 MG/DL (ref 8.4–10.2)
CHLORIDE SERPL-SCNC: 103 MMOL/L (ref 98–107)
CHLORIDE SERPL-SCNC: 105 MMOL/L (ref 98–107)
CK MB SERPL-MCNC: 0.9 NG/ML (ref ?–4.55)
CK MB SERPL-MCNC: 0.92 NG/ML (ref ?–4.55)
CK SERPL-CCNC: 88 U/L (ref 30–135)
CO2 SERPL-SCNC: 22 MMOL/L (ref 22–30)
CO2 SERPL-SCNC: 24 MMOL/L (ref 22–30)
ERYTHROCYTE [DISTWIDTH] IN BLOOD BY AUTOMATED COUNT: 15.8 % (ref 11.5–14)
GLUCOSE SERPL-MCNC: 139 MG/DL (ref 75–110)
GLUCOSE SERPL-MCNC: 158 MG/DL (ref 75–110)
HCT VFR BLD CALC: 35.4 % (ref 36–47)
HGB BLD-MCNC: 11.5 G/DL (ref 12–15.5)
MCH RBC QN AUTO: 26.3 PG (ref 27–33.4)
MCHC RBC AUTO-ENTMCNC: 32.5 G/DL (ref 32–36)
MCV RBC AUTO: 81 FL (ref 80–97)
NT PRO BNP: 2750 PG/ML (ref 5–900)
PLATELET # BLD: 270 10^3/UL (ref 150–450)
POTASSIUM SERPL-SCNC: 3.7 MMOL/L (ref 3.6–5)
POTASSIUM SERPL-SCNC: 4.1 MMOL/L (ref 3.6–5)
PROT SERPL-MCNC: 6.5 G/DL (ref 6.3–8.2)
PROT SERPL-MCNC: 7 G/DL (ref 6.3–8.2)
RBC # BLD AUTO: 4.37 10^6/UL (ref 3.72–5.28)
TROPONIN I SERPL-MCNC: 0.02 NG/ML
TROPONIN I SERPL-MCNC: 0.02 NG/ML
WBC # BLD AUTO: 16.1 10^3/UL (ref 4–10.5)

## 2019-10-11 RX ADMIN — PIPERACILLIN AND TAZOBACTAM SCH MLS/HR: 3; .375 INJECTION, POWDER, LYOPHILIZED, FOR SOLUTION INTRAVENOUS; PARENTERAL at 03:28

## 2019-10-11 RX ADMIN — PIPERACILLIN AND TAZOBACTAM SCH MLS/HR: 3; .375 INJECTION, POWDER, LYOPHILIZED, FOR SOLUTION INTRAVENOUS; PARENTERAL at 09:47

## 2019-10-11 RX ADMIN — Medication SCH: at 05:25

## 2019-10-11 RX ADMIN — LOSARTAN POTASSIUM SCH MG: 50 TABLET, FILM COATED ORAL at 22:48

## 2019-10-11 RX ADMIN — INSULIN HUMAN SCH UNIT: 100 INJECTION, SOLUTION PARENTERAL at 22:49

## 2019-10-11 RX ADMIN — VANCOMYCIN HYDROCHLORIDE SCH MLS/HR: 1 INJECTION, POWDER, LYOPHILIZED, FOR SOLUTION INTRAVENOUS at 18:22

## 2019-10-11 RX ADMIN — Medication SCH: at 16:36

## 2019-10-11 RX ADMIN — Medication SCH ML: at 22:32

## 2019-10-11 RX ADMIN — ASPIRIN 325 MG ORAL TABLET SCH MG: 325 PILL ORAL at 09:55

## 2019-10-11 RX ADMIN — INSULIN HUMAN SCH: 100 INJECTION, SOLUTION PARENTERAL at 12:28

## 2019-10-11 RX ADMIN — PANTOPRAZOLE SODIUM SCH: 40 TABLET, DELAYED RELEASE ORAL at 05:25

## 2019-10-11 RX ADMIN — SODIUM CHLORIDE PRN MLS/HR: 9 INJECTION, SOLUTION INTRAVENOUS at 10:06

## 2019-10-11 RX ADMIN — INSULIN HUMAN SCH: 100 INJECTION, SOLUTION PARENTERAL at 16:52

## 2019-10-11 RX ADMIN — VANCOMYCIN HYDROCHLORIDE SCH MLS/HR: 1 INJECTION, POWDER, LYOPHILIZED, FOR SOLUTION INTRAVENOUS at 05:45

## 2019-10-11 RX ADMIN — PANTOPRAZOLE SODIUM SCH MG: 40 TABLET, DELAYED RELEASE ORAL at 16:41

## 2019-10-11 RX ADMIN — SODIUM CHLORIDE PRN MLS/HR: 9 INJECTION, SOLUTION INTRAVENOUS at 18:23

## 2019-10-11 RX ADMIN — PIPERACILLIN AND TAZOBACTAM SCH MLS/HR: 3; .375 INJECTION, POWDER, LYOPHILIZED, FOR SOLUTION INTRAVENOUS; PARENTERAL at 22:32

## 2019-10-11 RX ADMIN — SODIUM CHLORIDE PRN MLS/HR: 9 INJECTION, SOLUTION INTRAVENOUS at 03:31

## 2019-10-11 RX ADMIN — PIPERACILLIN AND TAZOBACTAM SCH MLS/HR: 3; .375 INJECTION, POWDER, LYOPHILIZED, FOR SOLUTION INTRAVENOUS; PARENTERAL at 16:41

## 2019-10-11 RX ADMIN — ACETAMINOPHEN PRN MG: 325 TABLET ORAL at 22:46

## 2019-10-11 NOTE — PDOC PROGRESS REPORT
Subjective


Progress Note for:: 10/11/19


Reason For Visit: 


DIABETIC INFECTION OF LEFT FOOT,LEUKOCYTOSIS,FEVER








Physical Exam


Vital Signs: 


                                        











Temp Pulse Resp BP Pulse Ox


 


 97.7 F   66   17   159/59 H  95 


 


 10/11/19 20:06  10/11/19 20:06  10/11/19 20:06  10/11/19 20:06  10/11/19 20:06








                                 Intake & Output











 10/10/19 10/11/19 10/12/19





 06:59 06:59 06:59


 


Intake Total  3150 1888


 


Balance  3150 1888


 


Weight  86.7 kg 86.7 kg











General appearance: PRESENT: no acute distress


Head exam: PRESENT: normocephalic


Eye exam: PRESENT: EOMI


Ear exam: PRESENT: normal external ear exam


Mouth exam: PRESENT: moist


Neck exam: PRESENT: full ROM


Respiratory exam: PRESENT: clear to auscultation belinda


Cardiovascular exam: PRESENT: RRR


Pulses: PRESENT: normal radial pulses, normal femoral pulses


GI/Abdominal exam: PRESENT: soft


Rectal exam: PRESENT: deferred


Extremities exam: PRESENT: other - left great toe amp site dressing removed. 

removed surgiceal gauze base appears dark, secondary to the gauze skin edges 

bleed on contact tendons visible.


Musculoskeletal exam: PRESENT: full ROM


Neurological exam: PRESENT: alert, awake, oriented to person, oriented to place


Skin exam: PRESENT: dry





Results


Laboratory Results: 


                                        





                                 10/11/19 03:39 





                                 10/11/19 09:35 





                                        











  10/11/19 10/11/19 10/11/19





  03:39 03:39 09:35


 


WBC  16.1 H  


 


RBC  4.37  


 


Hgb  11.5 L  


 


Hct  35.4 L  


 


MCV  81  


 


MCH  26.3 L  


 


MCHC  32.5  


 


RDW  15.8 H  


 


Plt Count  270  


 


Sodium   138.0 


 


Potassium   3.7 


 


Chloride   105 


 


Carbon Dioxide   24 


 


Anion Gap   9 


 


BUN   32 H 


 


Creatinine   1.09 


 


Est GFR ( Amer)   > 60 


 


Glucose   139 H 


 


Lactic Acid    0.9


 


Calcium   8.8 


 


Total Bilirubin   0.5 


 


AST   20 


 


Alkaline Phosphatase   69 


 


Total Protein   7.0 


 


Albumin   3.5 














  10/11/19





  09:35


 


WBC 


 


RBC 


 


Hgb 


 


Hct 


 


MCV 


 


MCH 


 


MCHC 


 


RDW 


 


Plt Count 


 


Sodium  136.2 L


 


Potassium  4.1


 


Chloride  103


 


Carbon Dioxide  22


 


Anion Gap  11


 


BUN  31 H


 


Creatinine  0.90


 


Est GFR (African Amer)  > 60


 


Glucose  158 H


 


Lactic Acid 


 


Calcium  8.6


 


Total Bilirubin  0.6


 


AST  17


 


Alkaline Phosphatase  69


 


Total Protein  6.5


 


Albumin  3.4 L








                                        











  10/10/19 10/10/19 10/11/19





  21:38 21:38 03:39


 


Creatine Kinase  80   88


 


CK-MB (CK-2)   0.56 


 


Troponin I   0.032 


 


NT-Pro-B Natriuret Pep   














  10/11/19 10/11/19 10/11/19





  03:39 09:35 09:35


 


Creatine Kinase   91 


 


CK-MB (CK-2)  0.90   0.92


 


Troponin I  0.021   0.016


 


NT-Pro-B Natriuret Pep  2750 H  











Impressions: 


                                        





Chest X-Ray  10/10/19 14:34


IMPRESSION:  NO ACUTE RADIOGRAPHIC FINDING IN THE CHEST.


 














Assessment & Plan





- Time


Time Spent with patient: 15-24 minutes





- Plan Summary


Plan Summary: 





s/p great toe amputation


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


base of wound dark due to the Surgicel gauze ?


will start wet to dry dressing changes and cont observation.

## 2019-10-12 LAB
ADD MANUAL DIFF: NO
BASOPHILS # BLD AUTO: 0 10^3/UL (ref 0–0.2)
BASOPHILS NFR BLD AUTO: 0.3 % (ref 0–2)
EOSINOPHIL # BLD AUTO: 0.1 10^3/UL (ref 0–0.6)
EOSINOPHIL NFR BLD AUTO: 1.3 % (ref 0–6)
ERYTHROCYTE [DISTWIDTH] IN BLOOD BY AUTOMATED COUNT: 16 % (ref 11.5–14)
FREE T4 (FREE THYROXINE): 1.76 NG/DL (ref 0.78–2.19)
HCT VFR BLD CALC: 34.4 % (ref 36–47)
HGB BLD-MCNC: 11.3 G/DL (ref 12–15.5)
LYMPHOCYTES # BLD AUTO: 0.9 10^3/UL (ref 0.5–4.7)
LYMPHOCYTES NFR BLD AUTO: 8.6 % (ref 13–45)
MCH RBC QN AUTO: 26.7 PG (ref 27–33.4)
MCHC RBC AUTO-ENTMCNC: 32.8 G/DL (ref 32–36)
MCV RBC AUTO: 81 FL (ref 80–97)
MONOCYTES # BLD AUTO: 1 10^3/UL (ref 0.1–1.4)
MONOCYTES NFR BLD AUTO: 10 % (ref 3–13)
NEUTROPHILS # BLD AUTO: 8.1 10^3/UL (ref 1.7–8.2)
NEUTS SEG NFR BLD AUTO: 79.8 % (ref 42–78)
PLATELET # BLD: 261 10^3/UL (ref 150–450)
RBC # BLD AUTO: 4.23 10^6/UL (ref 3.72–5.28)
T3FREE SERPL-MCNC: 2.84 PG/ML (ref 2.77–5.27)
TOTAL CELLS COUNTED % (AUTO): 100 %
TSH SERPL-ACNC: 1.91 UIU/ML (ref 0.47–4.68)
VANCOMYCIN,TROUGH: 10.8 UG/ML (ref 5–20)
WBC # BLD AUTO: 10.1 10^3/UL (ref 4–10.5)

## 2019-10-12 RX ADMIN — PIPERACILLIN AND TAZOBACTAM SCH MLS/HR: 3; .375 INJECTION, POWDER, LYOPHILIZED, FOR SOLUTION INTRAVENOUS; PARENTERAL at 15:56

## 2019-10-12 RX ADMIN — PANTOPRAZOLE SODIUM SCH MG: 40 TABLET, DELAYED RELEASE ORAL at 17:48

## 2019-10-12 RX ADMIN — ASPIRIN 325 MG ORAL TABLET SCH MG: 325 PILL ORAL at 10:10

## 2019-10-12 RX ADMIN — LOSARTAN POTASSIUM SCH MG: 50 TABLET, FILM COATED ORAL at 21:17

## 2019-10-12 RX ADMIN — INSULIN HUMAN SCH UNIT: 100 INJECTION, SOLUTION PARENTERAL at 08:30

## 2019-10-12 RX ADMIN — Medication SCH ML: at 06:20

## 2019-10-12 RX ADMIN — Medication SCH ML: at 14:05

## 2019-10-12 RX ADMIN — PIPERACILLIN AND TAZOBACTAM SCH MLS/HR: 3; .375 INJECTION, POWDER, LYOPHILIZED, FOR SOLUTION INTRAVENOUS; PARENTERAL at 21:18

## 2019-10-12 RX ADMIN — Medication SCH ML: at 21:18

## 2019-10-12 RX ADMIN — INSULIN HUMAN SCH: 100 INJECTION, SOLUTION PARENTERAL at 16:28

## 2019-10-12 RX ADMIN — INSULIN HUMAN SCH: 100 INJECTION, SOLUTION PARENTERAL at 11:25

## 2019-10-12 RX ADMIN — INSULIN HUMAN SCH: 100 INJECTION, SOLUTION PARENTERAL at 22:37

## 2019-10-12 RX ADMIN — COLLAGENASE SANTYL SCH APPLIC: 250 OINTMENT TOPICAL at 17:48

## 2019-10-12 RX ADMIN — PIPERACILLIN AND TAZOBACTAM SCH MLS/HR: 3; .375 INJECTION, POWDER, LYOPHILIZED, FOR SOLUTION INTRAVENOUS; PARENTERAL at 05:13

## 2019-10-12 RX ADMIN — VANCOMYCIN HYDROCHLORIDE SCH MLS/HR: 1 INJECTION, POWDER, LYOPHILIZED, FOR SOLUTION INTRAVENOUS at 06:19

## 2019-10-12 RX ADMIN — PIPERACILLIN AND TAZOBACTAM SCH MLS/HR: 3; .375 INJECTION, POWDER, LYOPHILIZED, FOR SOLUTION INTRAVENOUS; PARENTERAL at 08:30

## 2019-10-12 RX ADMIN — VANCOMYCIN HYDROCHLORIDE SCH MLS/HR: 1 INJECTION, POWDER, LYOPHILIZED, FOR SOLUTION INTRAVENOUS at 21:26

## 2019-10-12 RX ADMIN — VANCOMYCIN HYDROCHLORIDE SCH MLS/HR: 1 INJECTION, POWDER, LYOPHILIZED, FOR SOLUTION INTRAVENOUS at 14:06

## 2019-10-12 RX ADMIN — PANTOPRAZOLE SODIUM SCH MG: 40 TABLET, DELAYED RELEASE ORAL at 06:20

## 2019-10-12 NOTE — PDOC PROGRESS REPORT
Subjective


Progress Note for:: 10/12/19


Subjective:: 





Aggressive left foot debridement including complete amputation of left great 

toe, and webspace skin, soft tissue and tendons





64 year old female history of type 2 diabetes mellitus, hypertension, 

hypercholesteremia, left carotid endarterectomy came to the emergency room with 

complaints of left lower leg/left foot erythema and redness and ulceration.  She

went to see her pediatrician for callus removal as an outpatient and minor 

procedure was done on the left foot started having the redness increasing pain 

from yesterday decided to came to the emergency room for further evaluation.  

She has a fever of 103 in the emergency room with a WBC count of 20,000.  X-rays

done this morning with a different account shows gas formation in the right 

foot.  Consultation with Dr. Bailey is requested and also found to have new 

onset atrial fibrillation in the emergency room consultation with Dr. Ferrera

as requested.








[id number]-old female admitted with diabetic foot ulcer and gas gangrene

involving the left foot.  Status post removal of left great toe and extensive 

debridement of the wound.





10/12/2019-patient is comfortable in the bed communicating well.  Eating her 

breakfast.  No complaints.  The wound cultures came back positive for Proteus 

pansensitive.


Reason For Visit: 


DIABETIC INFECTION OF LEFT FOOT,LEUKOCYTOSIS,FEVER








Physical Exam


Vital Signs: 


                                        











Temp Pulse Resp BP Pulse Ox


 


 97.5 F   58 L  17   109/53 L  96 


 


 10/12/19 07:10  10/12/19 07:10  10/12/19 07:10  10/12/19 07:10  10/12/19 07:10








                                 Intake & Output











 10/11/19 10/12/19 10/13/19





 06:59 06:59 06:59


 


Intake Total 3150 2238 100


 


Output Total  400 


 


Balance 3150 1838 100


 


Weight 86.7 kg 86.7 kg 











General appearance: PRESENT: no acute distress, cooperative, obese


Head exam: PRESENT: atraumatic


Eye exam: PRESENT: PERRLA


Mouth exam: PRESENT: moist, tongue midline


Neck exam: ABSENT: carotid bruit, JVD, lymphadenopathy, thyromegaly


Respiratory exam: PRESENT: decreased breath sounds


Cardiovascular exam: PRESENT: RRR.  ABSENT: diastolic murmur, rubs, systolic 

murmur


GI/Abdominal exam: PRESENT: normal bowel sounds, soft.  ABSENT: distended, 

guarding, mass, organolmegaly, rebound, tenderness


Rectal exam: PRESENT: deferred


Extremities exam: PRESENT: full ROM.  ABSENT: calf tenderness, clubbing, pedal 

edema


Neurological exam: PRESENT: alert, awake, oriented to person, oriented to place,

oriented to time, oriented to situation, CN II-XII grossly intact.  ABSENT: 

motor sensory deficit


Psychiatric exam: PRESENT: appropriate affect, normal mood.  ABSENT: homicidal 

ideation, suicidal ideation





Results


Laboratory Results: 


                                        





                                 10/12/19 05:40 





                                 10/12/19 05:40 





                                        











  10/11/19 10/11/19 10/12/19





  09:35 09:35 05:40


 


WBC   


 


RBC   


 


Hgb   


 


Hct   


 


MCV   


 


MCH   


 


MCHC   


 


RDW   


 


Plt Count   


 


Seg Neutrophils %   


 


Sodium   136.2 L 


 


Potassium   4.1 


 


Chloride   103 


 


Carbon Dioxide   22 


 


Anion Gap   11 


 


BUN   31 H 


 


Creatinine   0.90  0.72


 


Est GFR ( Amer)   > 60  > 60


 


Glucose   158 H 


 


Lactic Acid  0.9  


 


Calcium   8.6 


 


Magnesium    2.0


 


Total Bilirubin   0.6 


 


AST   17 


 


Alkaline Phosphatase   69 


 


Total Protein   6.5 


 


Albumin   3.4 L 














  10/12/19





  05:40


 


WBC  10.1


 


RBC  4.23


 


Hgb  11.3 L


 


Hct  34.4 L


 


MCV  81


 


MCH  26.7 L


 


MCHC  32.8


 


RDW  16.0 H


 


Plt Count  261


 


Seg Neutrophils %  79.8 H


 


Sodium 


 


Potassium 


 


Chloride 


 


Carbon Dioxide 


 


Anion Gap 


 


BUN 


 


Creatinine 


 


Est GFR (African Amer) 


 


Glucose 


 


Lactic Acid 


 


Calcium 


 


Magnesium 


 


Total Bilirubin 


 


AST 


 


Alkaline Phosphatase 


 


Total Protein 


 


Albumin 








                                        











  10/10/19 10/10/19 10/11/19





  21:38 21:38 03:39


 


Creatine Kinase  80   88


 


CK-MB (CK-2)   0.56 


 


Troponin I   0.032 


 


NT-Pro-B Natriuret Pep   














  10/11/19 10/11/19 10/11/19





  03:39 09:35 09:35


 


Creatine Kinase   91 


 


CK-MB (CK-2)  0.90   0.92


 


Troponin I  0.021   0.016


 


NT-Pro-B Natriuret Pep  2750 H  











Impressions: 


                                        





Chest X-Ray  10/10/19 14:34


IMPRESSION:  NO ACUTE RADIOGRAPHIC FINDING IN THE CHEST.


 














Assessment and Plan





- Diagnosis


(1) Diabetic infection of left foot


Is this a current diagnosis for this admission?: Yes   


Plan: 


10/10/2019-patient is going to be admitted to [facility] as an inpatient.  To start 

her on IV vancomycin and Zosyn blood cultures wound cultures are requested.  MRI

of the left lower extremity was requested.  ID consult was requested.  Surgical 

consult was requested.  Started on morphine 1 mg IV every 4 as needed.  GI 

prophylaxis initiated.  DVT prophylaxis with heparin 5000 units every 8 hours 

requested.  To start her on insulin sliding scale.  Place a diabetic diet.  

Physical therapy consult on  consult is requested.





10/11/2019-patient admitted with left foot gas gangrene.  Status post removal of

the left great toe and extensive debridement of the wound.  As per the ID 

patient need a long-term IV antibiotic therapy.  No acute events in the last 24 

hours.  Afebrile.  presently on IV vancomycin and Zosyn.





10/12/2019-patient was admitted with a left foot gas gangrene and extensive 

debridement was done left great toe was removed.  ID recommendation is patient 

need 6 weeks of IV antibiotic therapy.  Wound cultures came back positive for 

Proteus mirabilis and pansensitive.  Blood cultures are negative.  Arterial 

Doppler was requested today.








(2) Fever


Qualifiers: 


   Fever type: unspecified   Qualified Code(s): R50.9 - Fever, unspecified   


Is this a current diagnosis for this admission?: Yes   


Plan: 


10/10/2019-patient came in with fever of 103.  Due to left foot cellulitis.  

Started on vancomycin and Zosyn, blood cultures urine cultures wound cultures 

are requested.





10/11/2019-patient is afebrile T-max is 98.5 fever most likely secondary to left

foot cellulitis with associated possible osteomyelitis and evidence of gas 

gangrene.





10/12/2019-patient came in with a fever of 103 which was resolved now.  T-max is

97.6.








(3) Leukocytosis


Qualifiers: 


   Leukocytosis type: bandemia   Qualified Code(s): D72.825 - Bandemia   


Is this a current diagnosis for this admission?: Yes   


Plan: 


10/10/2019-high WBC count most likely secondary to left foot cellulitis with und

erlying possible osteomyelitis.  Started on vancomycin and Zosyn cultures are 

requested.  MRI of the left lower extremity was requested to rule out 

osteomyelitis.





10/12/2019-patient came in with elevated WBC count most likely secondary to left

foot cellulitis with gas gangrene and associated osteomyelitis.





10/12/2019-patient came in with elevated WBC count today his WBC count is 10.1 

within normal limits.








(4) New onset atrial fibrillation


Is this a current diagnosis for this admission?: Yes   


Plan: 


10/10/2019-patient found to have new onset atrial fibrillation rate controlled. 

Consult patient with Dr. Ferrera is requested.  Possibly she need Eliquis 

down the line.





10/11/2019-patient came in with new onset atrial fibrillation in the morning 

heart rate is 134.  Patient was given Cardizem IV 10 mg 1 dose and the heart 

rate came down to 70s.  Because of the history of diabetes mellitus, 

hypertension in association with atrial fibrillation patient may need 

anticoagulation at the time of discharge.





10/12/2019-patient came in with new onset atrial fibrillation patient may be 

needed to be starting on anticoagulation with Eliquis.  


Surgical team is not planning for any procedures we can start her on Eliquis.








(5) HTN (hypertension)


Is this a current diagnosis for this admission?: No   


Plan: 


Patient has history of chronic essential hypertension.  She is on Cozaar at 

home.  Plan is to resume the medication during the hospital stay.  Latest blood 

pressure is 151/60.











10/11/2019-patient blood pressure is 123/53.  Plan to continue IV fluids.





10/12/2019-patient blood pressure today is 139/50.  Plan is to discontinue  IV 

fluids from today.








(6) Obesity (BMI 30.0-34.9)


Is this a current diagnosis for this admission?: No

## 2019-10-12 NOTE — PDOC PROGRESS REPORT
Subjective


Progress Note for:: 10/11/19


Subjective:: 





Aggressive left foot debridement including complete amputation of left great 

toe, and webspace skin, soft tissue and tendons





64 year old female history of type 2 diabetes mellitus, hypertension, 

hypercholesteremia, left carotid endarterectomy came to the emergency room with 

complaints of left lower leg/left foot erythema and redness and ulceration.  She

went to see her pediatrician for callus removal as an outpatient and minor 

procedure was done on the left foot started having the redness increasing pain 

from yesterday decided to came to the emergency room for further evaluation.  

She has a fever of 103 in the emergency room with a WBC count of 20,000.  X-rays

done this morning with a different account shows gas formation in the right 

foot.  Consultation with Dr. Bailey is requested and also found to have new 

onset atrial fibrillation in the emergency room consultation with Dr. Ferrera

as requested.








10/11/[id number]-old female admitted with diabetic foot ulcer and gas gangrene

involving the left foot.  Status post removal of left great toe and extensive 

debridement of the wound.


Reason For Visit: 


DIABETIC INFECTION OF LEFT FOOT,LEUKOCYTOSIS,FEVER








Physical Exam


Vital Signs: 


                                        











Temp Pulse Resp BP Pulse Ox


 


 97.5 F   58 L  17   109/53 L  96 


 


 10/12/19 07:10  10/12/19 07:10  10/12/19 07:10  10/12/19 07:10  10/12/19 07:10








                                 Intake & Output











 10/11/19 10/12/19 10/13/19





 06:59 06:59 06:59


 


Intake Total 3150 2238 100


 


Output Total  400 


 


Balance 3150 1838 100


 


Weight 86.7 kg 86.7 kg 











General appearance: PRESENT: no acute distress, obese


Head exam: PRESENT: atraumatic


Eye exam: PRESENT: PERRLA


Mouth exam: PRESENT: moist, tongue midline


Teeth exam: PRESENT: poor dentation


Neck exam: ABSENT: carotid bruit, JVD, lymphadenopathy, thyromegaly


Respiratory exam: PRESENT: decreased breath sounds


Cardiovascular exam: PRESENT: RRR.  ABSENT: diastolic murmur, rubs, systolic 

murmur


GI/Abdominal exam: PRESENT: normal bowel sounds, soft.  ABSENT: distended, 

guarding, mass, organolmegaly, rebound, tenderness


Rectal exam: PRESENT: deferred


Gentrourinary exam: PRESENT: other - Left foot is wrapped in dressing.  

Peripheral pulses are poor.


Neurological exam: PRESENT: alert, awake, oriented to person, oriented to place,

oriented to time, oriented to situation, CN II-XII grossly intact.  ABSENT: 

motor sensory deficit


Psychiatric exam: PRESENT: appropriate affect, normal mood.  ABSENT: homicidal 

ideation, suicidal ideation





Results


Laboratory Results: 


                                        





                                 10/12/19 05:40 





                                 10/12/19 05:40 





                                        











  10/11/19 10/11/19 10/12/19





  09:35 09:35 05:40


 


WBC   


 


RBC   


 


Hgb   


 


Hct   


 


MCV   


 


MCH   


 


MCHC   


 


RDW   


 


Plt Count   


 


Seg Neutrophils %   


 


Sodium   136.2 L 


 


Potassium   4.1 


 


Chloride   103 


 


Carbon Dioxide   22 


 


Anion Gap   11 


 


BUN   31 H 


 


Creatinine   0.90  0.72


 


Est GFR ( Amer)   > 60  > 60


 


Glucose   158 H 


 


Lactic Acid  0.9  


 


Calcium   8.6 


 


Magnesium    2.0


 


Total Bilirubin   0.6 


 


AST   17 


 


Alkaline Phosphatase   69 


 


Total Protein   6.5 


 


Albumin   3.4 L 














  10/12/19





  05:40


 


WBC  10.1


 


RBC  4.23


 


Hgb  11.3 L


 


Hct  34.4 L


 


MCV  81


 


MCH  26.7 L


 


MCHC  32.8


 


RDW  16.0 H


 


Plt Count  261


 


Seg Neutrophils %  79.8 H


 


Sodium 


 


Potassium 


 


Chloride 


 


Carbon Dioxide 


 


Anion Gap 


 


BUN 


 


Creatinine 


 


Est GFR (African Amer) 


 


Glucose 


 


Lactic Acid 


 


Calcium 


 


Magnesium 


 


Total Bilirubin 


 


AST 


 


Alkaline Phosphatase 


 


Total Protein 


 


Albumin 








                                        











  10/10/19 10/10/19 10/11/19





  21:38 21:38 03:39


 


Creatine Kinase  80   88


 


CK-MB (CK-2)   0.56 


 


Troponin I   0.032 


 


NT-Pro-B Natriuret Pep   














  10/11/19 10/11/19 10/11/19





  03:39 09:35 09:35


 


Creatine Kinase   91 


 


CK-MB (CK-2)  0.90   0.92


 


Troponin I  0.021   0.016


 


NT-Pro-B Natriuret Pep  2750 H  











Impressions: 


                                        





Chest X-Ray  10/10/19 14:34


IMPRESSION:  NO ACUTE RADIOGRAPHIC FINDING IN THE CHEST.


 














Assessment and Plan





- Diagnosis


(1) Diabetic infection of left foot


Is this a current diagnosis for this admission?: Yes   


Plan: 


10/10/2019-patient is going to be admitted to [facility] as an inpatient.  To start 

her on IV vancomycin and Zosyn blood cultures wound cultures are requested.  MRI

of the left lower extremity was requested.  ID consult was requested.  Surgical 

consult was requested.  Started on morphine 1 mg IV every 4 as needed.  GI 

prophylaxis initiated.  DVT prophylaxis with heparin 5000 units every 8 hours 

requested.  To start her on insulin sliding scale.  Place a diabetic diet.  

Physical therapy consult on  consult is requested.





10/11/2019-patient admitted with left foot gas gangrene.  Status post removal of

the left great toe and extensive debridement of the wound.  As per the ID 

patient need a long-term IV antibiotic therapy.  No acute events in the last 24 

hours.  Afebrile.  presently on IV vancomycin and Zosyn.








(2) Fever


Qualifiers: 


   Fever type: unspecified   Qualified Code(s): R50.9 - Fever, unspecified   


Is this a current diagnosis for this admission?: Yes   


Plan: 


10/10/2019-patient came in with fever of 103.  Due to left foot cellulitis.  

Started on vancomycin and Zosyn, blood cultures urine cultures wound cultures 

are requested.





10/11/2019-patient is afebrile T-max is 98.5 fever most likely secondary to left

foot cellulitis with associated possible osteomyelitis and evidence of gas 

gangrene.








(3) Leukocytosis


Qualifiers: 


   Leukocytosis type: bandemia   Qualified Code(s): D72.825 - Bandemia   


Is this a current diagnosis for this admission?: Yes   


Plan: 


10/10/2019-high WBC count most likely secondary to left foot cellulitis with 

underlying possible osteomyelitis.  Started on vancomycin and Zosyn cultures are

requested.  MRI of the left lower extremity was requested to rule out 

osteomyelitis.





10/12/2019-patient came in with elevated WBC count most likely secondary to left

foot cellulitis with gas gangrene and associated osteomyelitis.








(4) New onset atrial fibrillation


Is this a current diagnosis for this admission?: Yes   


Plan: 


10/10/2019-patient found to have new onset atrial fibrillation rate controlled. 

Consult patient with Dr. Ferrera is requested.  Possibly she need Eliquis 

down the line.





10/11/2019-patient came in with new onset atrial fibrillation in the morning 

heart rate is 134.  Patient was given Cardizem IV 10 mg 1 dose and the heart 

rate came down to 70s.  Because of the history of diabetes mellitus, 

hypertension in association with atrial fibrillation patient may need 

anticoagulation at the time of discharge.








(5) HTN (hypertension)


Is this a current diagnosis for this admission?: No   


Plan: 


Patient has history of chronic essential hypertension.  She is on Cozaar at 

home.  Plan is to resume the medication during the hospital stay.  Latest blood 

pressure is 151/60.





10/11/2019-patient blood pressure is 123/53.  Plan to continue IV fluids.








(6) Obesity (BMI 30.0-34.9)


Is this a current diagnosis for this admission?: No   





- Time


Time Spent with patient: 25-34 minutes


Medications reviewed and adjusted accordingly: Yes


Anticipated discharge: Home, SNF

## 2019-10-12 NOTE — PROGRESS NOTE
Provider Note


Provider Note: 








[facility] Infectious Disease Antimicrobial Stewardship Consultation





Patient is a 64-year-old woman who has DM2, hypertension, hypercholesterolemia, 

carotid endarterectomy and was admitted on 10/10 due to a diabetic foot 

infection.  Per notes, patient had an ulcer in her foot and callus.  She went to

her podiatrist/wound clinic to remove callus and for debridement of the ulcer. 

After the procedure, patient experienced more pain in her foot and redness, 

swelling, some drainage as well. She went to the ED and upon evaluation, she was

found febrile with T max of 103, leukocytosis of 20k, per notes, there was gas 

formation in the wound and she had new onset atrial fibrillation. She was sept

ic, therefore she was started on vancomycin and zosyn.  She was evaluated by 

podiatry/surgery.  She was taken to the OR same day for great toe amputation and

debridement.  Per surgeons notes, he had to amputate the great toe and resect 

the 1st metatarsal.  After the procedure, her WBC improved, now normal.  She has

been afebrile. Blood cultures from 10/10 remain negative in 2 days.  Wound 

cultures Gram stain had GNR and GPC, there is growth of a Gram negative patricia. ID 

consulted for antibiotics recommendations.





PMH:





DM2


Hypertension


Hypercholesterolemia


Presumptive PVD


Atherosclerosis





PSH:





Carotid endarterectomy





Medications:





Acetaminophen (Tylenol 325 Mg Tablet)  650 mg PO Q4HP PRN


Aspirin (Aspirin 325 Mg Tablet)  325 mg PO DAILY [facility]   


Piperacillin Sod/Tazobactam (Sod 3.375 gm/ Sodium Chloride)  100 mls @ 200 

mls/hr IV Q6A [facility]   


Vancomycin HCl 1,000 mg/ (Dextrose)  250 mls @ 166.667 mls/hr IV Q12A [facility]   


Sodium Chloride (Nacl 0.9% 1000 Ml Iv Soln)  1,000 mls @ 75 mls/hr IV CONTINUOUS

PRN   


Insulin Human Regular (Humulin R (Pyxis) Insulin 100 Unit/Ml 3ml)  0 - 12 unit 

SUBCUT ACHS [facility]; Protocol   


Losartan Potassium (Cozaar 50 Mg Tablet)  50 mg PO QHS [facility]   


Pantoprazole Sodium (Protonix 40 Mg Dr Tablet)  40 mg PO BID@0600,1700 [facility]   


Empagliflozin [ Jardiance] 10 Mg Tablet  1 dose PO DAILY [facility]   


Simvastatin (Zocor 40 Mg Tablet)  80 mg PO QHS [facility]


   


Allergies:





glipizide [From Glucotrol] Allergy (Verified 10/10/19 10:43)


   





Vital Signs:











Temp Pulse Resp BP Pulse Ox


 


 97.6 F   53 L  17   139/50 H  96 


 


 10/12/19 03:43  10/12/19 03:43  10/12/19 03:43  10/12/19 03:43  10/12/19 03:43








                                 Intake & Output











 10/11/19 10/12/19 10/13/19





 06:59 06:59 06:59


 


Intake Total 3150 2238 


 


Output Total  400 


 


Balance 3150 1838 


 


Weight 86.7 kg 86.7 kg 








                                  Weight/Height





Weight                           86.7 kg


Height                           5 ft 5 in





Laboratories:





                                        





                                 10/12/19 05:40 





                                 10/12/19 05:40 





                                        











MCV  81 fl (80-97)   10/12/19  05:40    


 


MCH  26.7 pg (27.0-33.4)  L  10/12/19  05:40    


 


MCHC  32.8 g/dL (32.0-36.0)   10/12/19  05:40    


 


RDW  16.0 % (11.5-14.0)  H  10/12/19  05:40    


 


Seg Neutrophils %  79.8 % (42-78)  H  10/12/19  05:40    


 


VBG pH  7.42  (7.30-7.42)   10/10/19  11:55    


 


VBG pCO2  37.6 mmHg (35-63)   10/10/19  11:55    


 


VBG HCO3  23.8 mmol/L (20-32)   10/10/19  11:55    


 


VBG Base Excess  -0.4 mmol/L  10/10/19  11:55    


 


Chloride  103 mmol/L ()   10/11/19  09:35    


 


Carbon Dioxide  22 mmol/L (22-30)   10/11/19  09:35    


 


Anion Gap  11  (5-19)   10/11/19  09:35    


 


Est GFR ( Amer)  > 60  (>60)   10/12/19  05:40    


 


Glucose  158 mg/dL ()  H  10/11/19  09:35    


 


Lactic Acid  0.9 mmol/L (0.7-2.1)   10/11/19  09:35    


 


Calcium  8.6 mg/dL (8.4-10.2)   10/11/19  09:35    


 


Magnesium  2.0 mg/dL (1.6-2.3)   10/12/19  05:40    


 


Total Bilirubin  0.6 mg/dL (0.2-1.3)   10/11/19  09:35    


 


AST  17 U/L (14-36)   10/11/19  09:35    


 


Alkaline Phosphatase  69 U/L ()   10/11/19  09:35    


 


Total Protein  6.5 g/dL (6.3-8.2)   10/11/19  09:35    


 


Albumin  3.4 g/dL (3.5-5.0)  L  10/11/19  09:35    


 


Urine Color  YELLOW   10/10/19  15:20    


 


Urine Appearance  SLIGHTLY-CLOUDY   10/10/19  15:20    


 


Urine pH  5.0  (5.0-9.0)   10/10/19  15:20    


 


Ur Specific Gravity  1.030   10/10/19  15:20    


 


Urine Protein  100 mg/dL (NEGATIVE)  H  10/10/19  15:20    


 


Urine Glucose (UA)  >=500 mg/dL (NEGATIVE)  H  10/10/19  15:20    


 


Urine Ketones  20 mg/dL (NEGATIVE)  H  10/10/19  15:20    


 


Urine Blood  SMALL  (NEGATIVE)  H  10/10/19  15:20    


 


Urine Nitrite  NEGATIVE  (NEGATIVE)   10/10/19  15:20    


 


Ur Leukocyte Esterase  NEGATIVE  (NEGATIVE)   10/10/19  15:20    


 


Urine WBC (Auto)  3 /HPF  10/10/19  15:20    


 


Urine RBC (Auto)  2 /HPF  10/10/19  15:20    








                                        











  10/10/19 10/10/19 10/11/19





  21:38 21:38 03:39


 


Creatine Kinase  80   88


 


CK-MB (CK-2)   0.56 


 


Troponin I   0.032 


 


NT-Pro-B Natriuret Pep   














  10/11/19 10/11/19 10/11/19





  03:39 09:35 09:35


 


Creatine Kinase   91 


 


CK-MB (CK-2)  0.90   0.92


 


Troponin I  0.021   0.016


 


NT-Pro-B Natriuret Pep  2750 H  








Microbiology:





Blood cultures





10/10/19   Negative in 2 days





Wound Culture/Tissue Culture





10/10   Gram Stain GPC, GNR -> 4+ GNR





Radiology:





Chest X-Ray  10/10/19 14:34


IMPRESSION:  NO ACUTE RADIOGRAPHIC FINDING IN THE CHEST.


 


Assessment and Recommendations:





Patient evaluated for diabetic foot infection and osteomyelitis. She was septic 

on admission requiring surgical intervention for source control.  There was 

amputation of the great toe and debridement of skin, soft tissue, fascia and 

bone.  Even after amputation of great toe and resection of the 1st metatarsal, 

there is concern of residual osteomyelitis as patient may require further 

debridement.  The status of her peripheral circulation is unknown (not sure if 

she has had evaluation for peripheral vascular disease).  In terms of antibiotic

therapy, she will need 6 weeks.  The cultures are too young to give final 

recommendations.  Can't de escalate yet as there were GPC and GNRs in the Gram 

stain, although the predominant organisms seem to be GNRs. She needs to continue

current therapy until these are identified.  Please call if updates/questions.





Anu Lundberg MD


[facility] Infectious Disease


[phone]

## 2019-10-12 NOTE — EKG REPORT
SEVERITY:- ABNORMAL ECG -

ATRIAL FIBRILLATION, V-RATE 45-71

RBBB AND LPFB

:

Confirmed by: Richi Asher MD 12-Oct-2019 17:10:20

## 2019-10-12 NOTE — PDOC CONSULTATION
Consultation-Blank


Consultation: 





CARDIOLOGY CONSULTATION by Dr. Rhonda Ferrera on 10/12/2019.  Patient seen 

at 12 noon.  60 minutes spent on this patient with more than 50% time spent in 

direct patient care.





REASON FOR CONSULTATION: Patient with Atrial Fibrillation of unknown duration.





CONSULT REQUESTING PHYSICIAN: Dr. Maria, [facility] hospitalist physician.





HISTORY PRESENT ILLNESS: Patient is a 64-year-old  female with known 

history of hypertension and diabetes mellitus, who is being admitted with 

diabetic foot ulcer for which she had debridement and amputation of the left 

great toe..  She on admission was found to be in atrial fibrillation, of unknown

duration, since the patient has no prior knowledge of her being in this state of

rhythm, and the patient being asymptomatic without any palpitations.  She has no

history of coronary artery disease or MI or anginal symptoms.  The patient has 

no history of congestive heart failure.  There is no history of palpitations or 

syncope.  There is no PND orthopnea or leg edema.  There is no chest pain or 

discomfort.  She has no history of sleep apnea or COPD or pulmonary embolism.  

The patient's mental responses bradycardic in the rate in the 50s with a stable 

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 

disease.  Physical examination does not show any significant murmur of aortic 

stenosis.








Past Medical History


Cardiac Medical History: Reports: Hyperlipidema, Hypertension


Endocrine Medical History: Reports: Diabetes Mellitus Type 2





Past Surgical History


Past Surgical History: Reports: Vascular Surgery - Left carotid endarterectomy. 

Aggressive debridement and amputation of left big toe.





Social History


Information Source: Patient


Smoking Status: Never Smoker


Electronic Cigarette use?: No





- Advance Directive


Resuscitation Status: Full Code.  The patient's daughter is a surrogate 

healthcare decision maker.





Family History


Family History: Both mom and sister has strokes.  No history of atrial 

fibrillation or or coronary artery disease.








Medication/Allergy


Home Medications: 


Aspirin [Aspirin 325 mg Tablet] 325 mg PO DAILY 10/10/19 


Empagliflozin [Jardiance] 10 mg PO DAILY 10/10/19 


Insulin Aspart [Novolog Flexpen] 0 units SQ .PERSLIDINGSCALE 10/10/19 


Insulin Degludec [Tresiba Flextouch U-200] 45 units SQ DAILY 10/10/19 


Losartan Potassium [Cozaar 25 mg Tablet] 25 mg PO DAILY 10/10/19 


Metformin HCl [Glucophage] 1,000 mg PO [id number] 10/10/19 


Saxagliptin HCl/Metformin HCl [Kombiglyze Xr 5-1,000 mg Tab] 1 tab PO WSUPPER 

10/10/19 


Simvastatin [Zocor 80 mg Tablet] 80 mg PO QHS 10/10/19 





Allergies/Adverse Reactions: glipizide [From Glucotrol] 


Review of Systems


Constitutional: PRESENT: fatigue, fever(s), weakness.  ABSENT: headache(s), 

night sweats


Eyes: ABSENT: visual disturbances


Ears: ABSENT: hearing changes


Nose, Mouth, and Throat: ABSENT: sore throat


Cardiovascular: ABSENT: orthropnea, palpitations


Respiratory: ABSENT: dyspnea, hemoptysis


Gastrointestinal: ABSENT: coffee ground emesis, diarrhea, dysphagia, hematemesis


Genitourinary: ABSENT: dysuria, hematuria


Musculoskeletal: PRESENT: joint swelling, other - Erythema and swelling of the 

left foot present.


Neurological: ABSENT: abnormal gait, abnormal speech, confusion, dizziness, 

focal weakness, syncope


Psychiatric: ABSENT: anxiety, depression, homidical ideation, suicidal ideation.





PHYSICAL EXAMINATION: The patient is mildly obese.  In no acute distress.


                                                                Selected Entries











  10/12/19





  11:45


 


Temperature 98.2 F


 


Temperature Oral





Source 


 


Pulse Rate 59 L


 


Respiratory 18





Rate 


 


Blood Pressure 148/72 H


 


Blood Pressure 97





Mean 


 


BP Location Right Arm


 


BP Position Supine


 


O2 Sat by Pulse 95





Oximetry 


 


Oxygen Delivery Room Air





Method 





HEAD: Is atraumatic normocephalic.  EYES: Pupils are equal round regular 

reactive to light and accommodation.  There is no clinical pallor.  There is no 

scleral icterus.  External ocular movements are normal.  EARS: Tympanic 

membranes are intact.  External auditory canals are clear.  NOSE: Nasal mucous 

membranes are not inflamed.  There is no deviated nasal septum.  MOUTH: Mucous 

membranes of mouth are moist.  Tongue is moist.  There is no ulcers.  There is 

no bleeding from the gums.  THROAT: There is no redness of the oropharynx.  

There is no exudates in the throat.  SKIN: There is no petechia or ecchymosis.  

There is no skin rashes or skin lesions.  NECK: Is supple.  There is no JVD.  

Carotids are equal there well-healed scar of left carotid endarterectomy, and 

faint bilateral bruits.  There is no lymphadenopathy.  There is no goiter.  

There is no accessory muscles of respiration in use.  Trachea central.  LUNGS: 

There is diminished air entry and prolonged expiration.  On percussion there is 

hyperresonance.  There is scattered rhonchi present.  There is no rales or 

wheezing.  There is no chest wall tenderness on palpation.  HEART: S1-S2 is 

heard.  S1 is of variable intensity.  There is no S3 gallop.  There is no S4 

gallop.  There is systolic murmur left sternal border and the apex, without 

radiation.  There is no murmur of aortic stenosis.  Prosthetic aortic valve 

click heard crisply.  There is no aortic regurgitation murmur..  There is no 

rub.  ABDOMEN: Is  Nontender.  There is no hepatosplenomegaly.  Bowel sounds are

well heard.  EXTREMITIES: Femorals are deep.  Femorals are slightly diminished. 

There is no femoral bruits.  There is no pedal edema.  There is no DVT or 

cellulitis.  Leg pulses are diminished.  There is no cyanosis or clubbing.  

Capillary refill is normal.  There is no calf tenderness.  There is dressing in 

the left leg.  CNS: The patient is conscious awake alert oriented x3 with no 

focal deficits.  PSYCHIATRIC: The patient judgment and insight are intact her 

affect is normal..





Current Medications











Generic Name Dose Route Start Last Admin





  Trade Name Freq  PRN Reason Stop Dose Admin


 


Acetaminophen  650 mg  10/11/19 12:13  10/11/19 22:46





  Tylenol 325 Mg Tablet  PO  11/10/19 12:12  650 mg





  Q4HP PRN   Administration





  FOR BACK PAIN  


 


Apixaban  5 mg  10/13/19 10:00 





  Eliquis 5 Mg Tablet  PO  11/12/19 09:59 





  Q12 ABEBA  


 


Aspirin  325 mg  10/11/19 10:00  10/12/19 10:10





  Aspirin 325 Mg Tablet  PO  11/10/19 09:59  325 mg





  DAILY ABEBA   Administration


 


Collagenase  1 applic  10/12/19 18:00  10/12/19 17:48





  Santyl Ointment 30 Gm  TP  11/11/19 17:59  1 applic





  BID ABEBA   Administration


 


Dextrose  12.5 gm  10/10/19 14:40 





  Dextrose Inj 50% Syringe (25 Gm/50 Ml)  IV  11/09/19 14:39 





  PRN PRN  





  FOR BG 50-69 IN ALERT PATIENT  





  Protocol  


 


Dextrose  25 gm  10/10/19 14:40 





  Dextrose Inj 50% Syringe (25 Gm/50 Ml)  IV  11/09/19 14:39 





  PRN PRN  





  PER PROTOCOL  





  Protocol  


 


Glucagon  1 mg  10/10/19 14:40 





  Glucagen Inj 1 Mg Vial  IM  11/09/19 14:39 





  PRN PRN  





  Evaluate for BG < 70  





  Protocol  


 


Glucose  15 gm  10/10/19 14:40 





  Glutose 40% Gel 15 Gm Tube  PO  11/09/19 14:39 





  PRN PRN  





  FOR BG 50-69 IN ALERT PATIENT  





  Protocol  


 


Glucose  30 gm  10/10/19 14:40 





  Glutose 40% Gel 15 Gm Tube  PO  11/09/19 14:39 





  PRN PRN  





  FOR BG < 50 IN ALERT PATIENT   





  Protocol  


 


Piperacillin Sod/Tazobactam  100 mls @ 200 mls/hr  10/10/19 16:00  10/12/19 

16:30





  Sod 3.375 gm/ Sodium Chloride  IV  10/17/19 15:59  Infused





  Q6A ABEBA   Infusion


 


Vancomycin HCl 750 mg/  250 mls @ 166.667 mls/hr  10/12/19 14:00  10/12/19 16:30





  Dextrose  IV  10/17/19 13:59  Infused





  Q8 ABEBA   Infusion


 


Insulin Human Regular  0 - 12 unit  10/10/19 16:00  10/12/19 16:28





  Humulin R (Pyxis) Insulin 100 Unit/Ml 3ml  SUBCUT  11/09/19 15:59  Not Given





  ACHS ABEBA  





  Protocol  


 


Losartan Potassium  50 mg  10/11/19 22:00  10/11/19 22:48





  Cozaar 50 Mg Tablet  PO  11/10/19 21:59  50 mg





  QHS ABEBA   Administration


 


Pantoprazole Sodium  40 mg  10/10/19 17:00  10/12/19 17:48





  Protonix 40 Mg Dr Tablet  PO  11/09/19 16:59  40 mg





  BID@0600,1700 ABEBA   Administration


 


Empagliflozin [  1 dose  10/11/19 22:00  10/12/19 10:10





Jardiance] 10 Mg  PO  11/10/19 21:59  1 tab





Tablet  DAILY ABEBA   Administration


 


Simvastatin  40 mg  10/12/19 22:00 





  Zocor 40 Mg Tablet  PO  11/11/19 21:59 





  QHS ABEBA  


 


Sodium Chloride  2.5 ml  10/10/19 22:00  10/12/19 14:05





  Saline Flush 2.5 Ml Monoject Prefil Syrin  IV  11/09/19 21:59  2.5 ml





  Q8 ABEBA   Administration














Discontinued Medications














Generic Name Dose Route Start Last Admin





  Trade Name Freq  PRN Reason Stop Dose Admin


 


Acetaminophen  975 mg  10/10/19 12:20  10/10/19 12:23





  Tylenol 325 Mg Tablet  PO  10/10/19 12:21  975 mg





  NOW ONE   Administration


 


Acetaminophen  Confirm  10/11/19 10:09  10/11/19 12:19





  Tylenol 325 Mg Tablet  Administered  10/11/19 10:10  325 mg





  Dose   Administration





  325 mg  





  .ROUTE  





  .STK-MED ONE  


 


Apixaban  5 mg  10/12/19 17:45  10/12/19 17:48





  Eliquis 5 Mg Tablet  PO  10/12/19 17:46  5 mg





  NOW ONE   Administration


 


Diltiazem HCl  10 mg  10/11/19 07:39  10/11/19 09:49





  Cardizem Inj 25 Mg/5 Ml Vial  IV  10/11/19 07:40  10 mg





  NOW ONE   Administration


 


Diphenhydramine HCl  12.5 mg  10/10/19 18:30 





  Benadryl Inj 50 Mg/1 Ml Vial  IV  10/10/19 21:30 





  .WHILE IN PACU PRN  





  ITCHING  


 


Ertapenem  1 gm  10/10/19 11:26  10/10/19 12:13





  Invanz Inj 1 Gm Vial  IV  10/10/19 11:27  1 gm





  IVBAG (ED) ONE   Administration


 


Fentanyl Citrate  Confirm  10/10/19 17:29 





  Sublimaze Inj/Pf 100 Mcg/2 Ml Ampule  Administered  10/10/19 17:30 





  Dose  





  100 mcg  





  .ROUTE  





  .STK-MED ONE  


 


Fentanyl Citrate  25 mcg  10/10/19 18:30 





  Sublimaze Inj/Pf 100 Mcg/2 Ml Ampule  IV  10/10/19 21:30 





  .WHILE IN PACU PRN  





  PAIN SCALE 2-3  


 


Fentanyl Citrate  12.5 mcg  10/10/19 18:30 





  Sublimaze Inj/Pf 100 Mcg/2 Ml Ampule  IV  10/10/19 21:30 





  .WHILE IN PACU PRN  





  PAIN SCALE OF 1  


 


Fentanyl Citrate  50 mcg  10/10/19 18:30 





  Sublimaze Inj/Pf 100 Mcg/2 Ml Ampule  IV  10/10/19 21:30 





  .WHILE IN PACU PRN  





  PAIN SCALE 4-5  


 


Fentanyl Citrate  Confirm  10/10/19 19:37 





  Sublimaze Inj/Pf 100 Mcg/2 Ml Ampule  Administered  10/10/19 19:38 





  Dose  





  100 mcg  





  .ROUTE  





  .STK-MED ONE  


 


Sodium Chloride  1,000 mls @ 125 mls/hr  10/10/19 14:31  10/11/19 10:03





  Nacl 0.9% 1000 Ml Iv Soln  IV  11/09/19 14:30  0 mls/hr





  CONTINUOUS PRN   Infusion





  THIS MED IS NOT "PRN"  


 


Vancomycin HCl 1,000 mg/  250 mls @ 166.667 mls/hr  10/10/19 18:00  10/12/19 

10:48





  Dextrose  IV  10/17/19 17:59  Infused





  Q12A ABEBA   Infusion


 


Acetaminophen  Confirm  10/10/19 20:16  10/10/19 20:20





  Ofirmev Inj/Pf 1000 Mg/100 Ml Sdv  Administered  10/10/19 20:17  100 mls





  Dose   Administration





  1,000 mg in 100 mls @ ud  





  IV  





  .STK-MED ONE  


 


Sodium Chloride  1,000 mls @ 75 mls/hr  10/11/19 07:38  10/12/19 10:48





  Nacl 0.9% 1000 Ml Iv Soln  IV  11/09/19 14:30  Infused





  CONTINUOUS PRN   Infusion





  THIS MED IS NOT "PRN"  


 


Ketorolac Tromethamine  Confirm  10/10/19 19:36 





  Toradol Inj/Pf 60 Mg/2 Ml Sdv  Administered  10/10/19 19:37 





  Dose  





  60 mg  





  .ROUTE  





  .STK-MED ONE  


 


Losartan Potassium  50 mg  10/11/19 10:00  10/12/19 05:02





  Cozaar 50 Mg Tablet  PO  11/10/19 09:59  Not Given





  DAILY ABEBA  


 


Midazolam HCl  Confirm  10/10/19 17:29 





  Versed 2 Mg/2 Ml Inj  Administered  10/10/19 17:30 





  Dose  





  2 mg  





  .ROUTE  





  .STK-MED ONE  


 


Morphine Sulfate  1 mg  10/10/19 14:40 





  Morphine 10 Mg/Ml Inj  IV  10/17/19 14:39 





  Q4HP PRN  





  FOR PAIN  


 


Propofol  Confirm  10/10/19 17:29 





  Diprivan Inj 200 Mg/20 Ml Vial  Administered  10/10/19 17:30 





  Dose  





  200 mg  





  IV  





  .STK-MED ONE  


 


Simvastatin  80 mg  10/11/19 22:00  10/11/19 22:48





  Zocor 40 Mg Tablet  PO  11/10/19 21:59  80 mg





  QHS ABEBA   Administration








                               Labs- Entire Visit











  10/10/19 10/10/19 10/10/19





  11:55 11:55 11:55


 


WBC  20.8 H  


 


RBC  4.97  


 


Hgb  12.9  


 


Hct  40.4  


 


MCV  81  


 


MCH  25.9 L  


 


MCHC  31.8 L  


 


RDW  16.1 H  


 


Plt Count  335  


 


Lymph % (Auto)  Not Reportable  


 


Mono % (Auto)  Not Reportable  


 


Eos % (Auto)  Not Reportable  


 


Baso % (Auto)  Not Reportable  


 


Absolute Neuts (auto)  Not Reportable  


 


Absolute Lymphs (auto)  Not Reportable  


 


Absolute Monos (auto)  Not Reportable  


 


Absolute Eos (auto)  Not Reportable  


 


Absolute Basos (auto)  Not Reportable  


 


Total Counted  100  


 


Seg Neutrophils %  Not Reportable  


 


Seg Neuts % (Manual)  92 H  


 


Band Neutrophils %  2 L  


 


Lymphocytes % (Manual)  2 L  


 


Monocytes % (Manual)  4  


 


Eosinophils % (Manual)  0  


 


Basophils % (Manual)  0  


 


Abs Neuts (Manual)  19.6 H  


 


Abs Lymphs (Manual)  0.4 L  


 


Abs Monocytes (Manual)  0.8  


 


Absolute Eos (Manual)  0.0  


 


Abs Basophils (Manual)  0.0  


 


Platelet Comment  ADEQUATE  


 


Anisocytosis  1+  


 


PT   15.3 


 


INR   1.20 


 


VBG pH   


 


VBG pCO2   


 


VBG HCO3   


 


VBG Base Excess   


 


Sodium    137.8


 


Potassium    4.6


 


Chloride    97 L


 


Carbon Dioxide    25


 


Anion Gap    16


 


BUN    21 H


 


Creatinine    0.99


 


Est GFR ( Amer)    > 60


 


Est GFR (MDRD) Non-Af    57 L


 


Glucose    125 H


 


POC Glucose   


 


Hemoglobin A1c %   


 


Lactic Acid   


 


Calcium    9.9


 


Magnesium   


 


Total Bilirubin    1.0


 


Direct Bilirubin    0.2


 


Neonat Total Bilirubin    Not Reportable


 


Neonat Direct Bilirubin    Not Reportable


 


Neonat Indirect Bili    Not Reportable


 


AST    17


 


ALT    11


 


Alkaline Phosphatase    91


 


Creatine Kinase   


 


CK-MB (CK-2)   


 


Troponin I   


 


NT-Pro-B Natriuret Pep   


 


Total Protein    7.9


 


Albumin    4.3


 


TSH   


 


Free T4   


 


Free T3 pg/mL   


 


Urine Color   


 


Urine Appearance   


 


Urine pH   


 


Ur Specific Gravity   


 


Urine Protein   


 


Urine Glucose (UA)   


 


Urine Ketones   


 


Urine Blood   


 


Urine Nitrite   


 


Urine Bilirubin   


 


Urine Urobilinogen   


 


Ur Leukocyte Esterase   


 


Urine WBC (Auto)   


 


Urine RBC (Auto)   


 


Urine Bacteria (Auto)   


 


Squamous Epi Cells Auto   


 


Urine Mucus (Auto)   


 


Urine Ascorbic Acid   


 


Time Trough Drawn   


 


Vancomycin Trough   














  10/10/19 10/10/19 10/10/19





  11:55 11:55 15:20


 


WBC   


 


RBC   


 


Hgb   


 


Hct   


 


MCV   


 


MCH   


 


MCHC   


 


RDW   


 


Plt Count   


 


Lymph % (Auto)   


 


Mono % (Auto)   


 


Eos % (Auto)   


 


Baso % (Auto)   


 


Absolute Neuts (auto)   


 


Absolute Lymphs (auto)   


 


Absolute Monos (auto)   


 


Absolute Eos (auto)   


 


Absolute Basos (auto)   


 


Total Counted   


 


Seg Neutrophils %   


 


Seg Neuts % (Manual)   


 


Band Neutrophils %   


 


Lymphocytes % (Manual)   


 


Monocytes % (Manual)   


 


Eosinophils % (Manual)   


 


Basophils % (Manual)   


 


Abs Neuts (Manual)   


 


Abs Lymphs (Manual)   


 


Abs Monocytes (Manual)   


 


Absolute Eos (Manual)   


 


Abs Basophils (Manual)   


 


Platelet Comment   


 


Anisocytosis   


 


PT   


 


INR   


 


VBG pH   7.42 


 


VBG pCO2   37.6 


 


VBG HCO3   23.8 


 


VBG Base Excess   -0.4 


 


Sodium   


 


Potassium   


 


Chloride   


 


Carbon Dioxide   


 


Anion Gap   


 


BUN   


 


Creatinine   


 


Est GFR ( Amer)   


 


Est GFR (MDRD) Non-Af   


 


Glucose   


 


POC Glucose   


 


Hemoglobin A1c %   


 


Lactic Acid  2.4 H  


 


Calcium   


 


Magnesium   


 


Total Bilirubin   


 


Direct Bilirubin   


 


Neonat Total Bilirubin   


 


Neonat Direct Bilirubin   


 


Neonat Indirect Bili   


 


AST   


 


ALT   


 


Alkaline Phosphatase   


 


Creatine Kinase   


 


CK-MB (CK-2)   


 


Troponin I   


 


NT-Pro-B Natriuret Pep   


 


Total Protein   


 


Albumin   


 


TSH   


 


Free T4   


 


Free T3 pg/mL   


 


Urine Color    YELLOW


 


Urine Appearance    SLIGHTLY-CLOUDY


 


Urine pH    5.0


 


Ur Specific Gravity    1.030


 


Urine Protein    100 H


 


Urine Glucose (UA)    >=500 H


 


Urine Ketones    20 H


 


Urine Blood    SMALL H


 


Urine Nitrite    NEGATIVE


 


Urine Bilirubin    NEGATIVE


 


Urine Urobilinogen    NEGATIVE


 


Ur Leukocyte Esterase    NEGATIVE


 


Urine WBC (Auto)    3


 


Urine RBC (Auto)    2


 


Urine Bacteria (Auto)    TRACE


 


Squamous Epi Cells Auto    1


 


Urine Mucus (Auto)    RARE


 


Urine Ascorbic Acid    NEGATIVE


 


Time Trough Drawn   


 


Vancomycin Trough   














  10/10/19 10/10/19 10/10/19





  21:38 21:38 21:54


 


WBC   


 


RBC   


 


Hgb   


 


Hct   


 


MCV   


 


MCH   


 


MCHC   


 


RDW   


 


Plt Count   


 


Lymph % (Auto)   


 


Mono % (Auto)   


 


Eos % (Auto)   


 


Baso % (Auto)   


 


Absolute Neuts (auto)   


 


Absolute Lymphs (auto)   


 


Absolute Monos (auto)   


 


Absolute Eos (auto)   


 


Absolute Basos (auto)   


 


Total Counted   


 


Seg Neutrophils %   


 


Seg Neuts % (Manual)   


 


Band Neutrophils %   


 


Lymphocytes % (Manual)   


 


Monocytes % (Manual)   


 


Eosinophils % (Manual)   


 


Basophils % (Manual)   


 


Abs Neuts (Manual)   


 


Abs Lymphs (Manual)   


 


Abs Monocytes (Manual)   


 


Absolute Eos (Manual)   


 


Abs Basophils (Manual)   


 


Platelet Comment   


 


Anisocytosis   


 


PT   


 


INR   


 


VBG pH   


 


VBG pCO2   


 


VBG HCO3   


 


VBG Base Excess   


 


Sodium   


 


Potassium   


 


Chloride   


 


Carbon Dioxide   


 


Anion Gap   


 


BUN   


 


Creatinine   


 


Est GFR ( Amer)   


 


Est GFR (MDRD) Non-Af   


 


Glucose   


 


POC Glucose    198 H


 


Hemoglobin A1c %   


 


Lactic Acid   


 


Calcium   


 


Magnesium   


 


Total Bilirubin   


 


Direct Bilirubin   


 


Neonat Total Bilirubin   


 


Neonat Direct Bilirubin   


 


Neonat Indirect Bili   


 


AST   


 


ALT   


 


Alkaline Phosphatase   


 


Creatine Kinase  80  


 


CK-MB (CK-2)   0.56 


 


Troponin I   0.032 


 


NT-Pro-B Natriuret Pep   


 


Total Protein   


 


Albumin   


 


TSH   


 


Free T4   


 


Free T3 pg/mL   


 


Urine Color   


 


Urine Appearance   


 


Urine pH   


 


Ur Specific Gravity   


 


Urine Protein   


 


Urine Glucose (UA)   


 


Urine Ketones   


 


Urine Blood   


 


Urine Nitrite   


 


Urine Bilirubin   


 


Urine Urobilinogen   


 


Ur Leukocyte Esterase   


 


Urine WBC (Auto)   


 


Urine RBC (Auto)   


 


Urine Bacteria (Auto)   


 


Squamous Epi Cells Auto   


 


Urine Mucus (Auto)   


 


Urine Ascorbic Acid   


 


Time Trough Drawn   


 


Vancomycin Trough   














  10/11/19 10/11/19 10/11/19





  03:39 03:39 03:39


 


WBC  16.1 H  


 


RBC  4.37  


 


Hgb  11.5 L  


 


Hct  35.4 L  


 


MCV  81  


 


MCH  26.3 L  


 


MCHC  32.5  


 


RDW  15.8 H  


 


Plt Count  270  


 


Lymph % (Auto)   


 


Mono % (Auto)   


 


Eos % (Auto)   


 


Baso % (Auto)   


 


Absolute Neuts (auto)   


 


Absolute Lymphs (auto)   


 


Absolute Monos (auto)   


 


Absolute Eos (auto)   


 


Absolute Basos (auto)   


 


Total Counted   


 


Seg Neutrophils %   


 


Seg Neuts % (Manual)   


 


Band Neutrophils %   


 


Lymphocytes % (Manual)   


 


Monocytes % (Manual)   


 


Eosinophils % (Manual)   


 


Basophils % (Manual)   


 


Abs Neuts (Manual)   


 


Abs Lymphs (Manual)   


 


Abs Monocytes (Manual)   


 


Absolute Eos (Manual)   


 


Abs Basophils (Manual)   


 


Platelet Comment   


 


Anisocytosis   


 


PT   


 


INR   


 


VBG pH   


 


VBG pCO2   


 


VBG HCO3   


 


VBG Base Excess   


 


Sodium   138.0 


 


Potassium   3.7 


 


Chloride   105 


 


Carbon Dioxide   24 


 


Anion Gap   9 


 


BUN   32 H 


 


Creatinine   1.09 


 


Est GFR ( Amer)   > 60 


 


Est GFR (MDRD) Non-Af   51 L 


 


Glucose   139 H 


 


POC Glucose   


 


Hemoglobin A1c %    6.4 H


 


Lactic Acid   


 


Calcium   8.8 


 


Magnesium   


 


Total Bilirubin   0.5 


 


Direct Bilirubin   0.2 


 


Neonat Total Bilirubin   Not Reportable 


 


Neonat Direct Bilirubin   Not Reportable 


 


Neonat Indirect Bili   Not Reportable 


 


AST   20 


 


ALT   9 


 


Alkaline Phosphatase   69 


 


Creatine Kinase   88 


 


CK-MB (CK-2)   


 


Troponin I   


 


NT-Pro-B Natriuret Pep   


 


Total Protein   7.0 


 


Albumin   3.5 


 


TSH   


 


Free T4   


 


Free T3 pg/mL   


 


Urine Color   


 


Urine Appearance   


 


Urine pH   


 


Ur Specific Gravity   


 


Urine Protein   


 


Urine Glucose (UA)   


 


Urine Ketones   


 


Urine Blood   


 


Urine Nitrite   


 


Urine Bilirubin   


 


Urine Urobilinogen   


 


Ur Leukocyte Esterase   


 


Urine WBC (Auto)   


 


Urine RBC (Auto)   


 


Urine Bacteria (Auto)   


 


Squamous Epi Cells Auto   


 


Urine Mucus (Auto)   


 


Urine Ascorbic Acid   


 


Time Trough Drawn   


 


Vancomycin Trough   














  10/11/19 10/11/19 10/11/19





  03:39 09:35 09:35


 


WBC   


 


RBC   


 


Hgb   


 


Hct   


 


MCV   


 


MCH   


 


MCHC   


 


RDW   


 


Plt Count   


 


Lymph % (Auto)   


 


Mono % (Auto)   


 


Eos % (Auto)   


 


Baso % (Auto)   


 


Absolute Neuts (auto)   


 


Absolute Lymphs (auto)   


 


Absolute Monos (auto)   


 


Absolute Eos (auto)   


 


Absolute Basos (auto)   


 


Total Counted   


 


Seg Neutrophils %   


 


Seg Neuts % (Manual)   


 


Band Neutrophils %   


 


Lymphocytes % (Manual)   


 


Monocytes % (Manual)   


 


Eosinophils % (Manual)   


 


Basophils % (Manual)   


 


Abs Neuts (Manual)   


 


Abs Lymphs (Manual)   


 


Abs Monocytes (Manual)   


 


Absolute Eos (Manual)   


 


Abs Basophils (Manual)   


 


Platelet Comment   


 


Anisocytosis   


 


PT   


 


INR   


 


VBG pH   


 


VBG pCO2   


 


VBG HCO3   


 


VBG Base Excess   


 


Sodium   


 


Potassium   


 


Chloride   


 


Carbon Dioxide   


 


Anion Gap   


 


BUN   


 


Creatinine   


 


Est GFR ( Amer)   


 


Est GFR (MDRD) Non-Af   


 


Glucose   


 


POC Glucose   


 


Hemoglobin A1c %   


 


Lactic Acid   


 


Calcium   


 


Magnesium   


 


Total Bilirubin   


 


Direct Bilirubin   


 


Neonat Total Bilirubin   


 


Neonat Direct Bilirubin   


 


Neonat Indirect Bili   


 


AST   


 


ALT   


 


Alkaline Phosphatase   


 


Creatine Kinase   91 


 


CK-MB (CK-2)  0.90   0.92


 


Troponin I  0.021   0.016


 


NT-Pro-B Natriuret Pep  2750 H  


 


Total Protein   


 


Albumin   


 


TSH   


 


Free T4   


 


Free T3 pg/mL   


 


Urine Color   


 


Urine Appearance   


 


Urine pH   


 


Ur Specific Gravity   


 


Urine Protein   


 


Urine Glucose (UA)   


 


Urine Ketones   


 


Urine Blood   


 


Urine Nitrite   


 


Urine Bilirubin   


 


Urine Urobilinogen   


 


Ur Leukocyte Esterase   


 


Urine WBC (Auto)   


 


Urine RBC (Auto)   


 


Urine Bacteria (Auto)   


 


Squamous Epi Cells Auto   


 


Urine Mucus (Auto)   


 


Urine Ascorbic Acid   


 


Time Trough Drawn   


 


Vancomycin Trough   














  10/11/19 10/11/19 10/11/19





  09:35 09:35 12:02


 


WBC   


 


RBC   


 


Hgb   


 


Hct   


 


MCV   


 


MCH   


 


MCHC   


 


RDW   


 


Plt Count   


 


Lymph % (Auto)   


 


Mono % (Auto)   


 


Eos % (Auto)   


 


Baso % (Auto)   


 


Absolute Neuts (auto)   


 


Absolute Lymphs (auto)   


 


Absolute Monos (auto)   


 


Absolute Eos (auto)   


 


Absolute Basos (auto)   


 


Total Counted   


 


Seg Neutrophils %   


 


Seg Neuts % (Manual)   


 


Band Neutrophils %   


 


Lymphocytes % (Manual)   


 


Monocytes % (Manual)   


 


Eosinophils % (Manual)   


 


Basophils % (Manual)   


 


Abs Neuts (Manual)   


 


Abs Lymphs (Manual)   


 


Abs Monocytes (Manual)   


 


Absolute Eos (Manual)   


 


Abs Basophils (Manual)   


 


Platelet Comment   


 


Anisocytosis   


 


PT   


 


INR   


 


VBG pH   


 


VBG pCO2   


 


VBG HCO3   


 


VBG Base Excess   


 


Sodium   136.2 L 


 


Potassium   4.1 


 


Chloride   103 


 


Carbon Dioxide   22 


 


Anion Gap   11 


 


BUN   31 H 


 


Creatinine   0.90 


 


Est GFR ( Amer)   > 60 


 


Est GFR (MDRD) Non-Af   > 60 


 


Glucose   158 H 


 


POC Glucose    120 H


 


Hemoglobin A1c %   


 


Lactic Acid  0.9  


 


Calcium   8.6 


 


Magnesium   


 


Total Bilirubin   0.6 


 


Direct Bilirubin   0.2 


 


Neonat Total Bilirubin   Not Reportable 


 


Neonat Direct Bilirubin   Not Reportable 


 


Neonat Indirect Bili   Not Reportable 


 


AST   17 


 


ALT   9 


 


Alkaline Phosphatase   69 


 


Creatine Kinase   


 


CK-MB (CK-2)   


 


Troponin I   


 


NT-Pro-B Natriuret Pep   


 


Total Protein   6.5 


 


Albumin   3.4 L 


 


TSH   


 


Free T4   


 


Free T3 pg/mL   


 


Urine Color   


 


Urine Appearance   


 


Urine pH   


 


Ur Specific Gravity   


 


Urine Protein   


 


Urine Glucose (UA)   


 


Urine Ketones   


 


Urine Blood   


 


Urine Nitrite   


 


Urine Bilirubin   


 


Urine Urobilinogen   


 


Ur Leukocyte Esterase   


 


Urine WBC (Auto)   


 


Urine RBC (Auto)   


 


Urine Bacteria (Auto)   


 


Squamous Epi Cells Auto   


 


Urine Mucus (Auto)   


 


Urine Ascorbic Acid   


 


Time Trough Drawn   


 


Vancomycin Trough   














  10/11/19 10/11/19 10/12/19





  16:43 21:22 05:40


 


WBC   


 


RBC   


 


Hgb   


 


Hct   


 


MCV   


 


MCH   


 


MCHC   


 


RDW   


 


Plt Count   


 


Lymph % (Auto)   


 


Mono % (Auto)   


 


Eos % (Auto)   


 


Baso % (Auto)   


 


Absolute Neuts (auto)   


 


Absolute Lymphs (auto)   


 


Absolute Monos (auto)   


 


Absolute Eos (auto)   


 


Absolute Basos (auto)   


 


Total Counted   


 


Seg Neutrophils %   


 


Seg Neuts % (Manual)   


 


Band Neutrophils %   


 


Lymphocytes % (Manual)   


 


Monocytes % (Manual)   


 


Eosinophils % (Manual)   


 


Basophils % (Manual)   


 


Abs Neuts (Manual)   


 


Abs Lymphs (Manual)   


 


Abs Monocytes (Manual)   


 


Absolute Eos (Manual)   


 


Abs Basophils (Manual)   


 


Platelet Comment   


 


Anisocytosis   


 


PT   


 


INR   


 


VBG pH   


 


VBG pCO2   


 


VBG HCO3   


 


VBG Base Excess   


 


Sodium   


 


Potassium   


 


Chloride   


 


Carbon Dioxide   


 


Anion Gap   


 


BUN   


 


Creatinine    0.72


 


Est GFR ( Amer)    > 60


 


Est GFR (MDRD) Non-Af    > 60


 


Glucose   


 


POC Glucose  107  191 H 


 


Hemoglobin A1c %   


 


Lactic Acid   


 


Calcium   


 


Magnesium    2.0


 


Total Bilirubin   


 


Direct Bilirubin   


 


Neonat Total Bilirubin   


 


Neonat Direct Bilirubin   


 


Neonat Indirect Bili   


 


AST   


 


ALT   


 


Alkaline Phosphatase   


 


Creatine Kinase   


 


CK-MB (CK-2)   


 


Troponin I   


 


NT-Pro-B Natriuret Pep   


 


Total Protein   


 


Albumin   


 


TSH   


 


Free T4   


 


Free T3 pg/mL   


 


Urine Color   


 


Urine Appearance   


 


Urine pH   


 


Ur Specific Gravity   


 


Urine Protein   


 


Urine Glucose (UA)   


 


Urine Ketones   


 


Urine Blood   


 


Urine Nitrite   


 


Urine Bilirubin   


 


Urine Urobilinogen   


 


Ur Leukocyte Esterase   


 


Urine WBC (Auto)   


 


Urine RBC (Auto)   


 


Urine Bacteria (Auto)   


 


Squamous Epi Cells Auto   


 


Urine Mucus (Auto)   


 


Urine Ascorbic Acid   


 


Time Trough Drawn   


 


Vancomycin Trough   














  10/12/19 10/12/19 10/12/19





  05:40 05:40 05:40


 


WBC   10.1 


 


RBC   4.23 


 


Hgb   11.3 L 


 


Hct   34.4 L 


 


MCV   81 


 


MCH   26.7 L 


 


MCHC   32.8 


 


RDW   16.0 H 


 


Plt Count   261 


 


Lymph % (Auto)   8.6 L 


 


Mono % (Auto)   10.0 


 


Eos % (Auto)   1.3 


 


Baso % (Auto)   0.3 


 


Absolute Neuts (auto)   8.1 


 


Absolute Lymphs (auto)   0.9 


 


Absolute Monos (auto)   1.0 


 


Absolute Eos (auto)   0.1 


 


Absolute Basos (auto)   0.0 


 


Total Counted   


 


Seg Neutrophils %   79.8 H 


 


Seg Neuts % (Manual)   


 


Band Neutrophils %   


 


Lymphocytes % (Manual)   


 


Monocytes % (Manual)   


 


Eosinophils % (Manual)   


 


Basophils % (Manual)   


 


Abs Neuts (Manual)   


 


Abs Lymphs (Manual)   


 


Abs Monocytes (Manual)   


 


Absolute Eos (Manual)   


 


Abs Basophils (Manual)   


 


Platelet Comment   


 


Anisocytosis   


 


PT   


 


INR   


 


VBG pH   


 


VBG pCO2   


 


VBG HCO3   


 


VBG Base Excess   


 


Sodium   


 


Potassium   


 


Chloride   


 


Carbon Dioxide   


 


Anion Gap   


 


BUN   


 


Creatinine   


 


Est GFR ( Amer)   


 


Est GFR (MDRD) Non-Af   


 


Glucose   


 


POC Glucose   


 


Hemoglobin A1c %   


 


Lactic Acid   


 


Calcium   


 


Magnesium   


 


Total Bilirubin   


 


Direct Bilirubin   


 


Neonat Total Bilirubin   


 


Neonat Direct Bilirubin   


 


Neonat Indirect Bili   


 


AST   


 


ALT   


 


Alkaline Phosphatase   


 


Creatine Kinase   


 


CK-MB (CK-2)   


 


Troponin I   


 


NT-Pro-B Natriuret Pep   


 


Total Protein   


 


Albumin   


 


TSH    1.91


 


Free T4    1.76


 


Free T3 pg/mL    2.84


 


Urine Color   


 


Urine Appearance   


 


Urine pH   


 


Ur Specific Gravity   


 


Urine Protein   


 


Urine Glucose (UA)   


 


Urine Ketones   


 


Urine Blood   


 


Urine Nitrite   


 


Urine Bilirubin   


 


Urine Urobilinogen   


 


Ur Leukocyte Esterase   


 


Urine WBC (Auto)   


 


Urine RBC (Auto)   


 


Urine Bacteria (Auto)   


 


Squamous Epi Cells Auto   


 


Urine Mucus (Auto)   


 


Urine Ascorbic Acid   


 


Time Trough Drawn  0540  


 


Vancomycin Trough  10.8  














  10/12/19 10/12/19 10/12/19





  08:04 11:11 16:09


 


WBC   


 


RBC   


 


Hgb   


 


Hct   


 


MCV   


 


MCH   


 


MCHC   


 


RDW   


 


Plt Count   


 


Lymph % (Auto)   


 


Mono % (Auto)   


 


Eos % (Auto)   


 


Baso % (Auto)   


 


Absolute Neuts (auto)   


 


Absolute Lymphs (auto)   


 


Absolute Monos (auto)   


 


Absolute Eos (auto)   


 


Absolute Basos (auto)   


 


Total Counted   


 


Seg Neutrophils %   


 


Seg Neuts % (Manual)   


 


Band Neutrophils %   


 


Lymphocytes % (Manual)   


 


Monocytes % (Manual)   


 


Eosinophils % (Manual)   


 


Basophils % (Manual)   


 


Abs Neuts (Manual)   


 


Abs Lymphs (Manual)   


 


Abs Monocytes (Manual)   


 


Absolute Eos (Manual)   


 


Abs Basophils (Manual)   


 


Platelet Comment   


 


Anisocytosis   


 


PT   


 


INR   


 


VBG pH   


 


VBG pCO2   


 


VBG HCO3   


 


VBG Base Excess   


 


Sodium   


 


Potassium   


 


Chloride   


 


Carbon Dioxide   


 


Anion Gap   


 


BUN   


 


Creatinine   


 


Est GFR ( Amer)   


 


Est GFR (MDRD) Non-Af   


 


Glucose   


 


POC Glucose  170 H  126 H  132 H


 


Hemoglobin A1c %   


 


Lactic Acid   


 


Calcium   


 


Magnesium   


 


Total Bilirubin   


 


Direct Bilirubin   


 


Neonat Total Bilirubin   


 


Neonat Direct Bilirubin   


 


Neonat Indirect Bili   


 


AST   


 


ALT   


 


Alkaline Phosphatase   


 


Creatine Kinase   


 


CK-MB (CK-2)   


 


Troponin I   


 


NT-Pro-B Natriuret Pep   


 


Total Protein   


 


Albumin   


 


TSH   


 


Free T4   


 


Free T3 pg/mL   


 


Urine Color   


 


Urine Appearance   


 


Urine pH   


 


Ur Specific Gravity   


 


Urine Protein   


 


Urine Glucose (UA)   


 


Urine Ketones   


 


Urine Blood   


 


Urine Nitrite   


 


Urine Bilirubin   


 


Urine Urobilinogen   


 


Ur Leukocyte Esterase   


 


Urine WBC (Auto)   


 


Urine RBC (Auto)   


 


Urine Bacteria (Auto)   


 


Squamous Epi Cells Auto   


 


Urine Mucus (Auto)   


 


Urine Ascorbic Acid   


 


Time Trough Drawn   


 


Vancomycin Trough   








                                        





Chest X-Ray  10/10/19 14:34


IMPRESSION:  NO ACUTE RADIOGRAPHIC FINDING IN THE CHEST.





EKG: Atrial fibrillation with ventricular response of 101 bpm.  There is right 

bundle-branch block pattern.  Diffuse nonspecific ST-T changes.


EKG from today shows atrial fibrillation with controlled ventricular response 

right bundle branch block pattern and left intravascular block.


 





IMPRESSION/RECOMMENDATION:





1.   Unknown duration of atrial fibrillation.  Patient has noticed prior history

of our knowledge of being in this rhythm.  And she is asymptomatic.  Hence need 

to assume that this is more than 48 hours.  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

and AV tali disease.  Would recommend a 30-day event monitor as an outpatient.


2.  Status post debridement and amputation of left toe diabetic ulcer.


3.  Hypertension: Blood pressure well controlled


4.  Diabetes mellitus insulin-dependent.


5.  CORRECTED DESHAUN VASC-2 score is 4.  [1 point for being female, one point for 

being hypertensive, and one point for diabetes mellitus, 1 point for peripheral 

vascular disease.]  Hence chronic anticoagulation therapy for stroke prophylaxis

definitely indicated since patient is at high risk for stroke.  Have discussed 

the various options of anticoagulation with Coumadin versus the newer oral 

anticoagulation agents.  I have discussed the need for periodic blood level 

checks for INR if the patient is on Coumadin, and the interaction of diet and 

medications with Coumadin has been discussed.  Also have discussed that if the 

patient opts to be on Coumadin and if she should have bleeding problems and if 

her INR is elevated this can be reversed with fresh frozen plasma and vitamin K.

 As opposed to the newer anticoagulation agents where there is no dietary 

restriction and the commonly used drugs do not interact with it.  But I have 

also discussed that if the patient should have bleeding problems there is no 

antidote and there is only supportive care.  The patient will decide.  Would 

start it if okayed by the surgeons.  This has been discussed at length with the 

patient and the patient's daughter.


6.  Peripheral vascular disease: History of left carotid endarterectomy.  This 

increases the possibility of the patient having coronary artery disease.  Later 

would recommend IV Lexiscan Cardiolite stress test.  This can be done as an 

outpatient.


7.  Systolic murmur: No definite clinical evidence of aortic stenosis.  Would 

recommend getting an echocardiogram to assess LV ejection fraction, mitral valve

disorder and aortic valve disorder.


8.Abnormal EKG with bifascicular block [right bundle branch block pattern and 

left anterior fascicular block.]  [RBBB and LAHB]





Medications reviewed.  Management plan discussed with the attending physician on

the case.  60 minutes spent on this patient with more than 50% of time spent in 

direct patient care medical decision making is of high complexity.  Will follow

## 2019-10-12 NOTE — PDOC PROGRESS REPORT
Subjective


Reason For Visit: 


DIABETIC INFECTION OF LEFT FOOT,LEUKOCYTOSIS,FEVER








Physical Exam


Vital Signs: 


                                        











Temp Pulse Resp BP Pulse Ox


 


 98.2 F   59 L  18   148/72 H  95 


 


 10/12/19 11:45  10/12/19 11:45  10/12/19 11:45  10/12/19 11:45  10/12/19 11:45








                                 Intake & Output











 10/11/19 10/12/19 10/13/19





 06:59 06:59 06:59


 


Intake Total 3150 2238 1450


 


Output Total  400 


 


Balance 3150 1838 1450


 


Weight 86.7 kg 86.7 kg 














Results


Laboratory Results: 


                                        





                                 10/12/19 05:40 





                                 10/12/19 05:40 





                                        











  10/12/19 10/12/19





  05:40 05:40


 


WBC   10.1


 


RBC   4.23


 


Hgb   11.3 L


 


Hct   34.4 L


 


MCV   81


 


MCH   26.7 L


 


MCHC   32.8


 


RDW   16.0 H


 


Plt Count   261


 


Seg Neutrophils %   79.8 H


 


Creatinine  0.72 


 


Est GFR (African Amer)  > 60 


 


Magnesium  2.0 








                                        











  10/10/19 10/10/19 10/11/19





  21:38 21:38 03:39


 


Creatine Kinase  80   88


 


CK-MB (CK-2)   0.56 


 


Troponin I   0.032 


 


NT-Pro-B Natriuret Pep   














  10/11/19 10/11/19 10/11/19





  03:39 09:35 09:35


 


Creatine Kinase   91 


 


CK-MB (CK-2)  0.90   0.92


 


Troponin I  0.021   0.016


 


NT-Pro-B Natriuret Pep  2750 H  











Impressions: 


                                        





Chest X-Ray  10/10/19 14:34


IMPRESSION:  NO ACUTE RADIOGRAPHIC FINDING IN THE CHEST.


 














Assessment & Plan





- Diagnosis


(1) Diabetic infection of left foot


Is this a current diagnosis for this admission?: Yes   





- Time


Time Spent with patient: Less than 15 minutes





- Plan Summary


Plan Summary: 





This is a 64-year-old female status post toe amputation for infection.  The 

patient denies any nausea, vomiting, fevers, chills.  She does report pain in 

the foot.  The dressing has been taken down by me today.  The wound appears 

clean and healthy.  I will initiate damp to dry dressing changes with packing 

daily.  Plan for delayed primary closure in several more days.  Okay for 

physical therapy.  Nonweightbearing to the distal portion of the foot.  Okay to 

weight-bear on the heel.  Will follow.

## 2019-10-13 LAB
ADD MANUAL DIFF: NO
ALBUMIN SERPL-MCNC: 3.3 G/DL (ref 3.5–5)
ALP SERPL-CCNC: 68 U/L (ref 38–126)
ANION GAP SERPL CALC-SCNC: 10 MMOL/L (ref 5–19)
AST SERPL-CCNC: 24 U/L (ref 14–36)
BASOPHILS # BLD AUTO: 0 10^3/UL (ref 0–0.2)
BASOPHILS NFR BLD AUTO: 0.5 % (ref 0–2)
BILIRUB DIRECT SERPL-MCNC: 0.2 MG/DL (ref 0–0.4)
BILIRUB SERPL-MCNC: 0.4 MG/DL (ref 0.2–1.3)
BUN SERPL-MCNC: 15 MG/DL (ref 7–20)
CALCIUM: 8.5 MG/DL (ref 8.4–10.2)
CHLORIDE SERPL-SCNC: 108 MMOL/L (ref 98–107)
CHOLEST SERPL-MCNC: 101.16 MG/DL (ref 0–200)
CO2 SERPL-SCNC: 25 MMOL/L (ref 22–30)
EOSINOPHIL # BLD AUTO: 0.3 10^3/UL (ref 0–0.6)
EOSINOPHIL NFR BLD AUTO: 3.6 % (ref 0–6)
ERYTHROCYTE [DISTWIDTH] IN BLOOD BY AUTOMATED COUNT: 16.1 % (ref 11.5–14)
GLUCOSE SERPL-MCNC: 110 MG/DL (ref 75–110)
HCT VFR BLD CALC: 32.7 % (ref 36–47)
HGB BLD-MCNC: 10.8 G/DL (ref 12–15.5)
LDLC SERPL DIRECT ASSAY-MCNC: 62 MG/DL (ref ?–100)
LYMPHOCYTES # BLD AUTO: 1.3 10^3/UL (ref 0.5–4.7)
LYMPHOCYTES NFR BLD AUTO: 13.9 % (ref 13–45)
MCH RBC QN AUTO: 26.4 PG (ref 27–33.4)
MCHC RBC AUTO-ENTMCNC: 32.9 G/DL (ref 32–36)
MCV RBC AUTO: 80 FL (ref 80–97)
MONOCYTES # BLD AUTO: 0.7 10^3/UL (ref 0.1–1.4)
MONOCYTES NFR BLD AUTO: 7.7 % (ref 3–13)
NEUTROPHILS # BLD AUTO: 6.8 10^3/UL (ref 1.7–8.2)
NEUTS SEG NFR BLD AUTO: 74.3 % (ref 42–78)
PLATELET # BLD: 309 10^3/UL (ref 150–450)
POTASSIUM SERPL-SCNC: 3.8 MMOL/L (ref 3.6–5)
PROT SERPL-MCNC: 6.8 G/DL (ref 6.3–8.2)
RBC # BLD AUTO: 4.08 10^6/UL (ref 3.72–5.28)
TOTAL CELLS COUNTED % (AUTO): 100 %
TRIGL SERPL-MCNC: 120 MG/DL (ref ?–150)
VANCOMYCIN,TROUGH: 13.5 UG/ML (ref 5–20)
VLDLC SERPL CALC-MCNC: 24 MG/DL (ref 10–31)
WBC # BLD AUTO: 9.2 10^3/UL (ref 4–10.5)

## 2019-10-13 RX ADMIN — APIXABAN SCH MG: 5 TABLET, FILM COATED ORAL at 09:42

## 2019-10-13 RX ADMIN — VANCOMYCIN HYDROCHLORIDE SCH MLS/HR: 1 INJECTION, POWDER, LYOPHILIZED, FOR SOLUTION INTRAVENOUS at 05:43

## 2019-10-13 RX ADMIN — LOSARTAN POTASSIUM SCH MG: 50 TABLET, FILM COATED ORAL at 21:41

## 2019-10-13 RX ADMIN — Medication SCH ML: at 05:43

## 2019-10-13 RX ADMIN — APIXABAN SCH MG: 5 TABLET, FILM COATED ORAL at 21:41

## 2019-10-13 RX ADMIN — INSULIN HUMAN SCH: 100 INJECTION, SOLUTION PARENTERAL at 08:02

## 2019-10-13 RX ADMIN — PIPERACILLIN AND TAZOBACTAM SCH MLS/HR: 3; .375 INJECTION, POWDER, LYOPHILIZED, FOR SOLUTION INTRAVENOUS; PARENTERAL at 21:41

## 2019-10-13 RX ADMIN — PANTOPRAZOLE SODIUM SCH MG: 40 TABLET, DELAYED RELEASE ORAL at 16:47

## 2019-10-13 RX ADMIN — COLLAGENASE SANTYL SCH APPLIC: 250 OINTMENT TOPICAL at 17:08

## 2019-10-13 RX ADMIN — PIPERACILLIN AND TAZOBACTAM SCH MLS/HR: 3; .375 INJECTION, POWDER, LYOPHILIZED, FOR SOLUTION INTRAVENOUS; PARENTERAL at 03:34

## 2019-10-13 RX ADMIN — PIPERACILLIN AND TAZOBACTAM SCH MLS/HR: 3; .375 INJECTION, POWDER, LYOPHILIZED, FOR SOLUTION INTRAVENOUS; PARENTERAL at 16:44

## 2019-10-13 RX ADMIN — ASPIRIN 325 MG ORAL TABLET SCH: 325 PILL ORAL at 09:53

## 2019-10-13 RX ADMIN — Medication SCH ML: at 21:42

## 2019-10-13 RX ADMIN — INSULIN HUMAN SCH UNIT: 100 INJECTION, SOLUTION PARENTERAL at 16:47

## 2019-10-13 RX ADMIN — PIPERACILLIN AND TAZOBACTAM SCH MLS/HR: 3; .375 INJECTION, POWDER, LYOPHILIZED, FOR SOLUTION INTRAVENOUS; PARENTERAL at 09:42

## 2019-10-13 RX ADMIN — VANCOMYCIN HYDROCHLORIDE SCH MLS/HR: 1 INJECTION, POWDER, LYOPHILIZED, FOR SOLUTION INTRAVENOUS at 14:48

## 2019-10-13 RX ADMIN — VANCOMYCIN HYDROCHLORIDE SCH MLS/HR: 1 INJECTION, POWDER, LYOPHILIZED, FOR SOLUTION INTRAVENOUS at 22:25

## 2019-10-13 RX ADMIN — INSULIN HUMAN SCH: 100 INJECTION, SOLUTION PARENTERAL at 21:49

## 2019-10-13 RX ADMIN — INSULIN HUMAN SCH: 100 INJECTION, SOLUTION PARENTERAL at 13:52

## 2019-10-13 RX ADMIN — Medication SCH ML: at 14:49

## 2019-10-13 RX ADMIN — ATORVASTATIN CALCIUM SCH MG: 20 TABLET, FILM COATED ORAL at 21:41

## 2019-10-13 RX ADMIN — PANTOPRAZOLE SODIUM SCH MG: 40 TABLET, DELAYED RELEASE ORAL at 05:42

## 2019-10-13 RX ADMIN — COLLAGENASE SANTYL SCH APPLIC: 250 OINTMENT TOPICAL at 13:00

## 2019-10-13 NOTE — PDOC PROGRESS REPORT
Subjective


Progress Note for:: 10/13/19


Subjective:: 





Aggressive left foot debridement including complete amputation of left great 

toe, and webspace skin, soft tissue and tendons





64 year old female history of type 2 diabetes mellitus, hypertension, 

hypercholesteremia, left carotid endarterectomy came to the emergency room with 

complaints of left lower leg/left foot erythema and redness and ulceration.  She

went to see her pediatrician for callus removal as an outpatient and minor 

procedure was done on the left foot started having the redness increasing pain 

from yesterday decided to came to the emergency room for further evaluation.  

She has a fever of 103 in the emergency room with a WBC count of 20,000.  X-rays

done this morning with a different account shows gas formation in the right 

foot.  Consultation with Dr. Bailey is requested and also found to have new 

onset atrial fibrillation in the emergency room consultation with Dr. Ferrera

as requested.








10/11/7117-28-fems-old female admitted with diabetic foot ulcer and gas gangrene

involving the left foot.  Status post removal of left great toe and extensive 

debridement of the wound.





10/12/2019-patient is comfortable in the bed communicating well.  Eating her 

breakfast.  No complaints.  The wound cultures came back positive for Proteus 

pansensitive.





10/13/2019-no acute events in the last 24 hours.  Patient was started on Eliquis

yesterday for atrial fibrillation.  Surgical follow-up was done Dr. Cotton spoke

to me his impression is patient may need a minor debridement other than that no 

surgical procedure was planned.  Patient is comfortable in the bed communicating

well.  Heart rate in the 50s dropping down to 40s last night.  Patient is not on

beta-blockers.  She is scheduled for stress test tomorrow.  Dr. Ferrera is on

board.


Reason For Visit: 


DIABETIC INFECTION OF LEFT FOOT,LEUKOCYTOSIS,FEVER








Physical Exam


Vital Signs: 


                                        











Temp Pulse Resp BP Pulse Ox


 


 97.5 F   51 L  16   121/45 L  97 


 


 10/12/19 23:32  10/12/19 23:32  10/12/19 23:32  10/12/19 23:32  10/12/19 23:32








                                 Intake & Output











 10/12/19 10/13/19 10/14/19





 06:59 06:59 06:59


 


Intake Total 2238 2350 350


 


Output Total 400 1200 


 


Balance 1838 1150 350


 


Weight 86.7 kg 86.2 kg 











General appearance: PRESENT: no acute distress, obese


Head exam: PRESENT: atraumatic


Eye exam: PRESENT: PERRLA


Mouth exam: PRESENT: moist, tongue midline


Teeth exam: PRESENT: poor dentation


Neck exam: ABSENT: carotid bruit, JVD, lymphadenopathy, thyromegaly


Respiratory exam: PRESENT: decreased breath sounds


Cardiovascular exam: PRESENT: bradycardia, systolic murmur


GI/Abdominal exam: PRESENT: normal bowel sounds, soft.  ABSENT: distended, 

guarding, mass, organolmegaly, rebound, tenderness


Rectal exam: PRESENT: deferred


Extremities exam: PRESENT: full ROM, other - Left foot wrapped in dressing..  AB

SENT: calf tenderness, clubbing, pedal edema


Neurological exam: PRESENT: alert, awake, oriented to person, oriented to place,

oriented to time, oriented to situation, CN II-XII grossly intact.  ABSENT: 

motor sensory deficit


Psychiatric exam: PRESENT: appropriate affect, normal mood.  ABSENT: homicidal 

ideation, suicidal ideation





Results


Laboratory Results: 


                                        





                                 10/13/19 04:45 





                                 10/13/19 04:45 





                                        











  10/12/19 10/13/19 10/13/19





  05:40 04:45 04:45


 


WBC   9.2 


 


RBC   4.08 


 


Hgb   10.8 L 


 


Hct   32.7 L 


 


MCV   80 


 


MCH   26.4 L 


 


MCHC   32.9 


 


RDW   16.1 H 


 


Plt Count   309 


 


Seg Neutrophils %   74.3 


 


Sodium    142.6


 


Potassium    3.8


 


Chloride    108 H


 


Carbon Dioxide    25


 


Anion Gap    10


 


BUN    15


 


Creatinine    0.79


 


Est GFR ( Amer)    > 60


 


Glucose    110


 


Calcium    8.5


 


Magnesium    1.9


 


Total Bilirubin    0.4


 


AST    24


 


Alkaline Phosphatase    68


 


Total Protein    6.8


 


Albumin    3.3 L


 


Triglycerides    120


 


Cholesterol    101.16


 


LDL Cholesterol Direct    62


 


VLDL Cholesterol    24.0


 


HDL Cholesterol    18 L


 


TSH  1.91  


 


Free T4  1.76  


 


Free T3 pg/mL  2.84  








                                        











  10/10/19 10/10/19 10/11/19





  21:38 21:38 03:39


 


Creatine Kinase  80   88


 


CK-MB (CK-2)   0.56 


 


Troponin I   0.032 


 


NT-Pro-B Natriuret Pep   














  10/11/19 10/11/19 10/11/19





  03:39 09:35 09:35


 


Creatine Kinase   91 


 


CK-MB (CK-2)  0.90   0.92


 


Troponin I  0.021   0.016


 


NT-Pro-B Natriuret Pep  2750 H  











Impressions: 


                                        





Chest X-Ray  10/10/19 14:34


IMPRESSION:  NO ACUTE RADIOGRAPHIC FINDING IN THE CHEST.


 














Assessment and Plan





- Diagnosis


(1) Diabetic infection of left foot


Is this a current diagnosis for this admission?: Yes   


Plan: 


10/10/2019-patient is going to be admitted to [facility] as an inpatient.  To start 

her on IV vancomycin and Zosyn blood cultures wound cultures are requested.  MRI

of the left lower extremity was requested.  ID consult was requested.  Surgical 

consult was requested.  Started on morphine 1 mg IV every 4 as needed.  GI 

prophylaxis initiated.  DVT prophylaxis with heparin 5000 units every 8 hours 

requested.  To start her on insulin sliding scale.  Place a diabetic diet.  

Physical therapy consult on  consult is requested.





10/11/2019-patient admitted with left foot gas gangrene.  Status post removal of

the left great toe and extensive debridement of the wound.  As per the ID 

patient need a long-term IV antibiotic therapy.  No acute events in the last 24 

hours.  Afebrile.  presently on IV vancomycin and Zosyn.





10/12/2019-patient was admitted with a left foot gas gangrene and extensive 

debridement was done left great toe was removed.  ID recommendation is patient 

need 6 weeks of IV antibiotic therapy.  Wound cultures came back positive for 

Proteus mirabilis and pansensitive.  Blood cultures are negative.  Arterial 

Doppler was requested today.





10/13/2019-status post left great toe removal and extensive debridement for gas 

gangrene of the left foot.  As per ID patient need 6 weeks of IV antibiotic 

therapy.  Wound cultures came back positive for Proteus mirabilis pansensitive. 

Arterial Doppler was requested to check the status of the peripheral vascular 

disease.  Test is pending.








(2) Fever


Qualifiers: 


   Fever type: unspecified   Qualified Code(s): R50.9 - Fever, unspecified   


Is this a current diagnosis for this admission?: Yes   





(3) Leukocytosis


Qualifiers: 


   Leukocytosis type: bandemia   Qualified Code(s): D72.825 - Bandemia   


Is this a current diagnosis for this admission?: Yes   


Plan: 


10/10/2019-high WBC count most likely secondary to left foot cellulitis with 

underlying possible osteomyelitis.  Started on vancomycin and Zosyn cultures are

requested.  MRI of the left lower extremity was requested to rule out 

osteomyelitis.





10/12/2019-patient came in with elevated WBC count most likely secondary to left

foot cellulitis with gas gangrene and associated osteomyelitis.





10/13/2019-patient came in with elevated WBC count today his WBC count is 10.1 

within normal limits.








(4) New onset atrial fibrillation


Is this a current diagnosis for this admission?: Yes   


Plan: 


10/10/2019-patient found to have new onset atrial fibrillation rate controlled. 

Consult patient with Dr. Ferrera is requested.  Possibly she need Eliquis 

down the line.





10/11/2019-patient came in with new onset atrial fibrillation in the morning 

heart rate is 134.  Patient was given Cardizem IV 10 mg 1 dose and the heart 

rate came down to 70s.  Because of the history of diabetes mellitus, 

hypertension in association with atrial fibrillation patient may need 

anticoagulation at the time of discharge.





10/12/2019-patient came in with new onset atrial fibrillation patient may be ne

eded to be starting on anticoagulation with Eliquis.  


Surgical team is not planning for any procedures we can start her on Eliquis.





10/13/2019-heart rate is in the 50s.  Not on beta-blockers.  Was started on 

Eliquis.  Dr. Ferrera is planning to arrange for stress test tomorrow.








(5) HTN (hypertension)


Is this a current diagnosis for this admission?: No   


Plan: 


Patient has history of chronic essential hypertension.  She is on Cozaar at 

home.  Plan is to resume the medication during the hospital stay.  Latest blood 

pressure is 151/60.











10/11/2019-patient blood pressure is 123/53.  Plan to continue IV fluids.





10/12/2019-patient blood pressure today is 139/50.  Plan is to discontinue  IV 

fluids from today.





10/13/2019-blood pressure today is 120/45.  Stable.  Patient is off the IV 

fluids.  Plan is to continue the present management.








(6) Obesity (BMI 30.0-34.9)


Is this a current diagnosis for this admission?: No   


Plan: 


10/10/2019-patient BMI is more than 31 diet exercise weight loss lifestyle 

modifications are discussed with the patient.

## 2019-10-13 NOTE — PROGRESS NOTE
Provider Note


Provider Note: 





CARDIOLOGY PROGRESS NOTE by Dr. Rhonda Gomes on 10/13/2019.





SUBJECTIVE the patient denies any chest pain or discomfort.  There is no PND 

orthopnea.  There is no arrhythmia seen on the monitor.  The patient has 

bradycardia with heart rate sometimes in the 40s but with stable blood pressure 

and with no symptoms.  The patient states prior to the diabetic ulcer 

development she was able to walk and could not exercise.  Hence at present in 

view of the atrial fibrillation, the bradycardia and the bifascicular block and 

since the patient is asymptomatic, in spite of multiple CAD risk factors.  Will 

cancel the stress test scheduled for tomorrow by the hospitalist.  We will 

schedule the patient for I for nuclear Cardiolite most likely excess Cardiolite 

stress test as an outpatient.  This is been discussed with the patient and the 

patient's daughter.  I have also discussed the need for a 30-day event monitor, 

to see if the patient has SA tali and AV tali disease I bradycardia and 

tachycardia episodes in which case the patient may need a permanent pacemaker.  

There is no TIA CVA symptoms.  There is no bleeding on Eliquis.





Physical EXAMINATION: The patient is mildly obese.  In no acute distress.  She 

is well-groomed


                                                                Selected Entries











  10/13/19 10/13/19





  15:19 16:00


 


Temperature 98.6 F 


 


Temperature Oral 





Source  


 


Pulse Rate 56 L 


 


Heart Rate (  48





Monitors)  


 


Respiratory 17 





Rate  


 


Blood Pressure 169/62 H 


 


Blood Pressure 97 





Mean  


 


BP Location Right Arm 


 


BP Position Supine 


 


O2 Sat by Pulse 95 





Oximetry  


 


Oxygen Delivery Room Air 





Method  








HEAD: Is atraumatic normocephalic.  EYES: Pupils are equal round regular 

reactive to light and accommodation.  There is no clinical pallor.  There is no 

scleral icterus.  External ocular movements are normal.  EARS: Tympanic 

membranes are intact.  External auditory canals are clear.  NOSE: Nasal mucous 

membranes are not inflamed.  There is no deviated nasal septum.  MOUTH: Mucous 

membranes of mouth are moist.  Tongue is moist.  There is no ulcers.  There is 

no bleeding from the gums.  THROAT: There is no redness of the oropharynx.  

There is no exudates in the throat.  SKIN: There is no petechia or ecchymosis.  

There is no skin rashes or skin lesions.  NECK: Is supple.  There is no JVD.  

Carotids are equal there well-healed scar of left carotid endarterectomy, and 

faint bilateral bruits.  There is no lymphadenopathy.  There is no goiter.  

There is no accessory muscles of respiration in use.  Trachea central.  LUNGS: 

There is diminished air entry and prolonged expiration.  On percussion there is 

hyperresonance.  There is scattered rhonchi present.  There is no rales or 

wheezing.  There is no chest wall tenderness on palpation.  HEART: S1-S2 is 

heard.  S1 is of variable intensity.  There is no S3 gallop.  There is no S4 

gallop.  There is systolic murmur left sternal border and the apex, without 

radiation.  There is no murmur of aortic stenosis.  Prosthetic aortic valve 

click heard crisply.  There is no aortic regurgitation murmur..  There is no 

rub.  ABDOMEN: Is  Nontender.  There is no hepatosplenomegaly.  Bowel sounds are

well heard.  EXTREMITIES: Femorals are deep.  Femorals are slightly diminished. 

There is no femoral bruits.  There is no pedal edema.  There is no DVT or 

cellulitis.  Leg pulses are diminished.  There is no cyanosis or clubbing.  

Capillary refill is normal.  There is no calf tenderness.  There is dressing in 

the left leg.  CNS: The patient is conscious awake alert oriented x3 with no 

focal deficits.  PSYCHIATRIC: The patient judgment and insight are intact her 

affect is normal..





                              Labs- All tests 24 hr











  10/12/19 10/13/19 10/13/19





  21:32 04:45 04:45


 


WBC   9.2 


 


RBC   4.08 


 


Hgb   10.8 L 


 


Hct   32.7 L 


 


MCV   80 


 


MCH   26.4 L 


 


MCHC   32.9 


 


RDW   16.1 H 


 


Plt Count   309 


 


Lymph % (Auto)   13.9 


 


Mono % (Auto)   7.7 


 


Eos % (Auto)   3.6 


 


Baso % (Auto)   0.5 


 


Absolute Neuts (auto)   6.8 


 


Absolute Lymphs (auto)   1.3 


 


Absolute Monos (auto)   0.7 


 


Absolute Eos (auto)   0.3 


 


Absolute Basos (auto)   0.0 


 


Seg Neutrophils %   74.3 


 


Sodium    142.6


 


Potassium    3.8


 


Chloride    108 H


 


Carbon Dioxide    25


 


Anion Gap    10


 


BUN    15


 


Creatinine    0.79


 


Est GFR ( Amer)    > 60


 


Est GFR (MDRD) Non-Af    > 60


 


Glucose    110


 


POC Glucose  133 H  


 


Calcium    8.5


 


Magnesium    1.9


 


Total Bilirubin    0.4


 


Direct Bilirubin    0.2


 


Neonat Total Bilirubin    Not Reportable


 


Neonat Direct Bilirubin    Not Reportable


 


Neonat Indirect Bili    Not Reportable


 


AST    24


 


ALT    13


 


Alkaline Phosphatase    68


 


Total Protein    6.8


 


Albumin    3.3 L


 


Triglycerides    120


 


Cholesterol    101.16


 


LDL Cholesterol Direct    62


 


VLDL Cholesterol    24.0


 


HDL Cholesterol    18 L


 


Time Trough Drawn   


 


Vancomycin Trough   














  10/13/19 10/13/19 10/13/19





  07:55 11:48 13:43


 


WBC   


 


RBC   


 


Hgb   


 


Hct   


 


MCV   


 


MCH   


 


MCHC   


 


RDW   


 


Plt Count   


 


Lymph % (Auto)   


 


Mono % (Auto)   


 


Eos % (Auto)   


 


Baso % (Auto)   


 


Absolute Neuts (auto)   


 


Absolute Lymphs (auto)   


 


Absolute Monos (auto)   


 


Absolute Eos (auto)   


 


Absolute Basos (auto)   


 


Seg Neutrophils %   


 


Sodium   


 


Potassium   


 


Chloride   


 


Carbon Dioxide   


 


Anion Gap   


 


BUN   


 


Creatinine   


 


Est GFR ( Amer)   


 


Est GFR (MDRD) Non-Af   


 


Glucose   


 


POC Glucose  118 H  131 H 


 


Calcium   


 


Magnesium   


 


Total Bilirubin   


 


Direct Bilirubin   


 


Neonat Total Bilirubin   


 


Neonat Direct Bilirubin   


 


Neonat Indirect Bili   


 


AST   


 


ALT   


 


Alkaline Phosphatase   


 


Total Protein   


 


Albumin   


 


Triglycerides   


 


Cholesterol   


 


LDL Cholesterol Direct   


 


VLDL Cholesterol   


 


HDL Cholesterol   


 


Time Trough Drawn    1343


 


Vancomycin Trough    13.5














  10/13/19





  15:22


 


WBC 


 


RBC 


 


Hgb 


 


Hct 


 


MCV 


 


MCH 


 


MCHC 


 


RDW 


 


Plt Count 


 


Lymph % (Auto) 


 


Mono % (Auto) 


 


Eos % (Auto) 


 


Baso % (Auto) 


 


Absolute Neuts (auto) 


 


Absolute Lymphs (auto) 


 


Absolute Monos (auto) 


 


Absolute Eos (auto) 


 


Absolute Basos (auto) 


 


Seg Neutrophils % 


 


Sodium 


 


Potassium 


 


Chloride 


 


Carbon Dioxide 


 


Anion Gap 


 


BUN 


 


Creatinine 


 


Est GFR ( Amer) 


 


Est GFR (MDRD) Non-Af 


 


Glucose 


 


POC Glucose  192 H


 


Calcium 


 


Magnesium 


 


Total Bilirubin 


 


Direct Bilirubin 


 


Neonat Total Bilirubin 


 


Neonat Direct Bilirubin 


 


Neonat Indirect Bili 


 


AST 


 


ALT 


 


Alkaline Phosphatase 


 


Total Protein 


 


Albumin 


 


Triglycerides 


 


Cholesterol 


 


LDL Cholesterol Direct 


 


VLDL Cholesterol 


 


HDL Cholesterol 


 


Time Trough Drawn 


 


Vancomycin Trough 








                                        





Chest X-Ray  10/10/19 14:34


IMPRESSION:  NO ACUTE RADIOGRAPHIC FINDING IN THE CHEST.


 





IMPRESSION/RECOMMENDATION:





1.   Unknown duration of atrial fibrillation.  Patient has noticed prior history

of our knowledge of being in this rhythm.  And she is asymptomatic.  Hence need 

to assume that this is more than 48 hours.  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

and AV tali disease.  Would recommend a 30-day event monitor as an outpatient.


2.  Status post debridement and amputation of left toe diabetic ulcer.


3.  Hypertension: Blood pressure well controlled


4.  Diabetes mellitus insulin-dependent.


5.  CORRECTED DESHAUN VASC-2 score is 4.  . The patient has been started on 

Eliquis.


6.  Peripheral vascular disease: History of left carotid endarterectomy.  This 

increases the possibility of the patient having coronary artery disease.  Later 

would recommend IV Lexiscan Cardiolite stress test.  This can be done as an 

outpatient.


7.  Systolic murmur: No definite clinical evidence of aortic stenosis.  Would 

recommend getting an echocardiogram to assess LV ejection fraction, mitral valve

disorder and aortic valve disorder.


8.Abnormal EKG with bifascicular block [right bundle branch block pattern and 

left anterior fascicular block.]  [RBBB and LAHB].


9.  Bradycardia, asymptomatic, with stable blood pressure, but with bifascicular

block.  Note that the patient's heart rate sometimes is 48 bpm.  Although she is

stable at present would not perform a IV Lexiscan Cardiolite stress test since 

there is increased risk of ventricular standstill.  The patient is asymptomatic.

 Hence will continue the patient's Cozaar and start the patient on amlodipine.  

We will continue patient on aspirin 81 mg p.o. daily, along with Eliquis.  This 

has been discussed in detail with the patient and patient's daughter.





All current medications reviewed.  Management plan and medication regimen 

discussed with attending physician on the case.  Discussed with the patient and 

patient's daughter.  Medical decision making is of high complexity.  This is in 

view of lengthy discussions with the patient, and patient's daughter about the 

patient's heart rhythm.  50 minutes spent on this patient more than 50% of the 

time spent in direct patient care.  Will follow

## 2019-10-13 NOTE — PDOC PROGRESS REPORT
Subjective


Progress Note for:: 10/13/19


Reason For Visit: 


DIABETIC INFECTION OF LEFT FOOT,LEUKOCYTOSIS,FEVER








Physical Exam


Vital Signs: 


                                        











Temp Pulse Resp BP Pulse Ox


 


 97.4 F   88   16   168/66 H  98 


 


 10/13/19 21:45  10/13/19 21:45  10/13/19 21:45  10/13/19 21:45  10/13/19 21:45








                                 Intake & Output











 10/12/19 10/13/19 10/14/19





 06:59 06:59 06:59


 


Intake Total 2238 2350 1620


 


Output Total 400 1200 


 


Balance 1838 1150 1620


 


Weight 86.7 kg 86.2 kg 














Results


Laboratory Results: 


                                        





                                 10/13/19 04:45 





                                 10/13/19 04:45 





                                        











  10/13/19 10/13/19





  04:45 04:45


 


WBC  9.2 


 


RBC  4.08 


 


Hgb  10.8 L 


 


Hct  32.7 L 


 


MCV  80 


 


MCH  26.4 L 


 


MCHC  32.9 


 


RDW  16.1 H 


 


Plt Count  309 


 


Seg Neutrophils %  74.3 


 


Sodium   142.6


 


Potassium   3.8


 


Chloride   108 H


 


Carbon Dioxide   25


 


Anion Gap   10


 


BUN   15


 


Creatinine   0.79


 


Est GFR (African Amer)   > 60


 


Glucose   110


 


Calcium   8.5


 


Magnesium   1.9


 


Total Bilirubin   0.4


 


AST   24


 


Alkaline Phosphatase   68


 


Total Protein   6.8


 


Albumin   3.3 L


 


Triglycerides   120


 


Cholesterol   101.16


 


LDL Cholesterol Direct   62


 


VLDL Cholesterol   24.0


 


HDL Cholesterol   18 L








                                        





10/10/19 15:20   Clean Catch Midstream   Urine Culture - Final


                            Yeast, Not Candida Albicans


                            Mixed Urogenital Bea


10/10/19 19:20   Foot - Left   Gram Stain - Final


10/10/19 19:20   Foot - Left   Wound Culture - Final


                            Proteus Mirabilis


                            Group B Beta Streptococcus


                            No Anaerobic Organisms





                                        











  10/10/19 10/10/19 10/11/19





  21:38 21:38 03:39


 


Creatine Kinase  80   88


 


CK-MB (CK-2)   0.56 


 


Troponin I   0.032 


 


NT-Pro-B Natriuret Pep   














  10/11/19 10/11/19 10/11/19





  03:39 09:35 09:35


 


Creatine Kinase   91 


 


CK-MB (CK-2)  0.90   0.92


 


Troponin I  0.021   0.016


 


NT-Pro-B Natriuret Pep  2750 H  











Impressions: 


                                        





Chest X-Ray  10/10/19 14:34


IMPRESSION:  NO ACUTE RADIOGRAPHIC FINDING IN THE CHEST.


 














Assessment & Plan





- Diagnosis


(1) Diabetic infection of left foot


Is this a current diagnosis for this admission?: Yes   





- Time


Time Spent with patient: Less than 15 minutes





- Plan Summary


Plan Summary: 





This is a 64-year-old female status post toe amputation for infection.  The 

patient denies any nausea, vomiting, fevers, chills.  She does report pain in 

the foot.  The dressing has been taken down by me today.  The foot appears 

significantly worse today.  There is significantly increased erythema and 

discoloration on the dorsal surface.  There is increasing marginal/necrotic 

tissue in the wound bed.  I suspect that the patient may require further 

amputation.  Will hold Eliquis.  Will follow.

## 2019-10-14 RX ADMIN — COLLAGENASE SANTYL SCH APPLIC: 250 OINTMENT TOPICAL at 09:53

## 2019-10-14 RX ADMIN — INSULIN HUMAN SCH: 100 INJECTION, SOLUTION PARENTERAL at 21:48

## 2019-10-14 RX ADMIN — INSULIN HUMAN SCH: 100 INJECTION, SOLUTION PARENTERAL at 17:17

## 2019-10-14 RX ADMIN — PIPERACILLIN AND TAZOBACTAM SCH MLS/HR: 3; .375 INJECTION, POWDER, LYOPHILIZED, FOR SOLUTION INTRAVENOUS; PARENTERAL at 17:19

## 2019-10-14 RX ADMIN — PIPERACILLIN AND TAZOBACTAM SCH MLS/HR: 3; .375 INJECTION, POWDER, LYOPHILIZED, FOR SOLUTION INTRAVENOUS; PARENTERAL at 03:06

## 2019-10-14 RX ADMIN — PANTOPRAZOLE SODIUM SCH MG: 40 TABLET, DELAYED RELEASE ORAL at 05:05

## 2019-10-14 RX ADMIN — Medication SCH ML: at 21:49

## 2019-10-14 RX ADMIN — COLLAGENASE SANTYL SCH APPLIC: 250 OINTMENT TOPICAL at 17:22

## 2019-10-14 RX ADMIN — Medication SCH: at 05:14

## 2019-10-14 RX ADMIN — PIPERACILLIN AND TAZOBACTAM SCH MLS/HR: 3; .375 INJECTION, POWDER, LYOPHILIZED, FOR SOLUTION INTRAVENOUS; PARENTERAL at 09:37

## 2019-10-14 RX ADMIN — VANCOMYCIN HYDROCHLORIDE SCH MLS/HR: 1 INJECTION, POWDER, LYOPHILIZED, FOR SOLUTION INTRAVENOUS at 05:05

## 2019-10-14 RX ADMIN — ATORVASTATIN CALCIUM SCH MG: 20 TABLET, FILM COATED ORAL at 21:48

## 2019-10-14 RX ADMIN — Medication SCH ML: at 13:27

## 2019-10-14 RX ADMIN — AMLODIPINE BESYLATE SCH MG: 2.5 TABLET ORAL at 09:37

## 2019-10-14 RX ADMIN — INSULIN HUMAN SCH: 100 INJECTION, SOLUTION PARENTERAL at 09:35

## 2019-10-14 RX ADMIN — PANTOPRAZOLE SODIUM SCH MG: 40 TABLET, DELAYED RELEASE ORAL at 17:21

## 2019-10-14 RX ADMIN — ASPIRIN 325 MG ORAL TABLET SCH MG: 325 PILL ORAL at 09:47

## 2019-10-14 RX ADMIN — VANCOMYCIN HYDROCHLORIDE SCH MLS/HR: 1 INJECTION, POWDER, LYOPHILIZED, FOR SOLUTION INTRAVENOUS at 21:49

## 2019-10-14 RX ADMIN — PIPERACILLIN AND TAZOBACTAM SCH MLS/HR: 3; .375 INJECTION, POWDER, LYOPHILIZED, FOR SOLUTION INTRAVENOUS; PARENTERAL at 20:49

## 2019-10-14 RX ADMIN — INSULIN HUMAN SCH: 100 INJECTION, SOLUTION PARENTERAL at 12:46

## 2019-10-14 RX ADMIN — LOSARTAN POTASSIUM SCH MG: 50 TABLET, FILM COATED ORAL at 21:49

## 2019-10-14 RX ADMIN — VANCOMYCIN HYDROCHLORIDE SCH MLS/HR: 1 INJECTION, POWDER, LYOPHILIZED, FOR SOLUTION INTRAVENOUS at 13:22

## 2019-10-14 RX ADMIN — ACETAMINOPHEN PRN MG: 325 TABLET ORAL at 21:51

## 2019-10-14 NOTE — PROGRESS NOTE
Provider Note


Provider Note: 





CARDIOLOGY PROGRESS NOTE by Dr. Rhonda Ferrera on 10/14/2019.


SUBJECTIVE: The patient denies any chest pain or discomfort.  The patient still 

in atrial fibrillation with a bradycardic response, but without any symptoms and

with a stable blood pressure.  She has no chest pain or discomfort there is no 

PND orthopnea or leg edema.  There is no TIA CVA symptoms.  It seems that the 

patient may require amputation of the left toes due to the infection not being 

well controlled.  The patient should be an acceptable risk for this.


PHYSICAL EXAMINATION: The patient is mildly obese.  She is well-groomed.


                                                                Selected Entries











  10/14/19





  08:15


 


Temperature 98.2 F


 


Temperature Oral





Source 


 


Pulse Rate 53 L


 


Respiratory 17





Rate 


 


Blood Pressure 165/63 H


 


Blood Pressure 97





Mean 


 


BP Location Right Arm


 


BP Position Supine


 


O2 Sat by Pulse 97





Oximetry 


 


Oxygen Delivery Room Air





Method 





HEAD: Is atraumatic normocephalic.  EYES: Pupils are equal round regular 

reactive to light and accommodation.  There is no clinical pallor.  There is no 

scleral icterus.  External ocular movements are normal.  EARS: Tympanic 

membranes are intact.  External auditory canals are clear.  NOSE: Nasal mucous 

membranes are not inflamed.  There is no deviated nasal septum.  MOUTH: Mucous 

membranes of mouth are moist.  Tongue is moist.  There is no ulcers.  There is 

no bleeding from the gums.  THROAT: There is no redness of the oropharynx.  

There is no exudates in the throat.  SKIN: There is no petechia or ecchymosis.  

There is no skin rashes or skin lesions.  NECK: Is supple.  There is no JVD.  

Carotids are equal there well-healed scar of left carotid endarterectomy, and 

faint bilateral bruits.  There is no lymphadenopathy.  There is no goiter.  

There is no accessory muscles of respiration in use.  Trachea central.  LUNGS: 

There is diminished air entry and prolonged expiration.  On percussion there is 

hyperresonance.  There is scattered rhonchi present.  There is no rales or 

wheezing.  There is no chest wall tenderness on palpation.  HEART: S1-S2 is 

heard.  S1 is of variable intensity.  There is no S3 gallop.  There is no S4 

gallop.  There is systolic murmur left sternal border and the apex, without 

radiation.  There is no murmur of aortic stenosis.  Prosthetic aortic valve 

click heard crisply.  There is no aortic regurgitation murmur..  There is no 

rub.  ABDOMEN: Is  Nontender.  There is no hepatosplenomegaly.  Bowel sounds are

well heard.  EXTREMITIES: Femorals are deep.  Femorals are slightly diminished. 

There is no femoral bruits.  There is no pedal edema.  There is no DVT or 

cellulitis.  Leg pulses are diminished.  There is no cyanosis or clubbing.  

Capillary refill is normal.  There is no calf tenderness.  There is dressing in 

the left leg.  CNS: The patient is conscious awake alert oriented x3 with no 

focal deficits.  PSYCHIATRIC: The patient judgment and insight are intact her 

affect is normal..


                              Labs- All tests 24 hr











  10/13/19 10/14/19 10/14/19





  21:30 08:15 12:11


 


POC Glucose  152 H  132 H  112 H














  10/14/19





  15:05


 


POC Glucose  125 H








                                        





Chest X-Ray  10/10/19 14:34


IMPRESSION:  NO ACUTE RADIOGRAPHIC FINDING IN THE CHEST.


 


IMPRESSION/RECOMMENDATION:


1.   Unknown duration of atrial fibrillation.  Patient has noticed prior history

of our knowledge of being in this rhythm.  And she is asymptomatic.  Hence need 

to assume that this is more than 48 hours.  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

and AV tali disease.  Would recommend a 30-day event monitor as an outpatient.


2.  Status post debridement and amputation of left toe diabetic ulcer.


3.  Hypertension: Blood pressure well controlled


4.  Diabetes mellitus insulin-dependent.


5.  CORRECTED DESHAUN VASC-2 score is 4.  . The patient was  on Eliquis.This has 

been held due to patient's impending left toe amputation.


6.  Peripheral vascular disease: History of left carotid endarterectomy.  This 

increases the possibility of the patient having coronary artery disease.  Later 

would recommend IV Lexiscan Cardiolite stress test.  This can be done as an 

outpatient.


7.  Systolic murmur: No definite clinical evidence of aortic stenosis.  Would 

recommend getting an echocardiogram to assess LV ejection fraction, mitral valve

disorder and aortic valve disorder.


8.Abnormal EKG with bifascicular block [right bundle branch block pattern and 

left anterior fascicular block.]  [RBBB and LAHB].


9.  Bradycardia, asymptomatic, with stable blood pressure, but with bifascicular

block


10.  Preoperative cardiac risk assessment: The patient will be is acceptable 

cardiac risk for this procedure under general or spinal anesthesia.





Medications reviewed.  Medication regimen and management plan discussed with the

patient and the attending physician on the case.  Medical decision making is of 

moderate complexity.  40 minutes spent on this patient with more than 50% of 

time spent in direct patient care.  Will follow

## 2019-10-14 NOTE — PDOC PROGRESS REPORT
Subjective


Progress Note for:: 10/14/19


Subjective:: 





Aggressive left foot debridement including complete amputation of left great 

toe, and webspace skin, soft tissue and tendons





64 year old female history of type 2 diabetes mellitus, hypertension, 

hypercholesteremia, left carotid endarterectomy came to the emergency room with 

complaints of left lower leg/left foot erythema and redness and ulceration.  She

went to see her pediatrician for callus removal as an outpatient and minor 

procedure was done on the left foot started having the redness increasing pain 

from yesterday decided to came to the emergency room for further evaluation.  

She has a fever of 103 in the emergency room with a WBC count of 20,000.  X-rays

done this morning with a different account shows gas formation in the right 

foot.  Consultation with Dr. Bailey is requested and also found to have new 

onset atrial fibrillation in the emergency room consultation with Dr. Ferrera

as requested.








10/11/[id number]-old female admitted with diabetic foot ulcer and gas gangrene

involving the left foot.  Status post removal of left great toe and extensive 

debridement of the wound.





10/12/2019-patient is comfortable in the bed communicating well.  Eating her 

breakfast.  No complaints.  The wound cultures came back positive for Proteus 

pansensitive.





10/13/2019-no acute events in the last 24 hours.  Patient was started on Eliquis

yesterday for atrial fibrillation.  Surgical follow-up was done Dr. Cotton spoke

to me his impression is patient may need a minor debridement other than that no 

surgical procedure was planned.  Patient is comfortable in the bed communicating

well.  Heart rate in the 50s dropping down to 40s last night.  Patient is not on

beta-blockers.  She is scheduled for stress test tomorrow.  Dr. Ferrera is on

board.





10/14/2019-no acute events in last 24 hours.  Afebrile.  As per the surgical 

team the surgical site looks worsening deteriorated patient may need further 

amputation.  I spoke to Dr. Higgins this morning ,,because the patient is on 

Eliquis  he wants to wait until Wednesday.


Reason For Visit: 


DIABETIC INFECTION OF LEFT FOOT,LEUKOCYTOSIS,FEVER








Physical Exam


Vital Signs: 


                                        











Temp Pulse Resp BP Pulse Ox


 


 98.1 F   56 L  18   145/48 H  95 


 


 10/14/19 03:41  10/14/19 07:00  10/14/19 03:41  10/14/19 03:41  10/14/19 03:41








                                 Intake & Output











 10/13/19 10/14/19 10/15/19





 06:59 06:59 06:59


 


Intake Total 2350 2420 


 


Output Total 1200 1900 


 


Balance 1150 520 


 


Weight 86.2 kg 84.1 kg 











General appearance: PRESENT: no acute distress, cooperative


Head exam: PRESENT: atraumatic


Eye exam: PRESENT: PERRLA


Mouth exam: PRESENT: moist, tongue midline


Teeth exam: PRESENT: poor dentation


Neck exam: ABSENT: carotid bruit, JVD, lymphadenopathy, thyromegaly


Respiratory exam: PRESENT: decreased breath sounds


Cardiovascular exam: PRESENT: RRR.  ABSENT: diastolic murmur, rubs, systolic 

murmur


GI/Abdominal exam: PRESENT: normal bowel sounds, soft.  ABSENT: distended, 

guarding, mass, organolmegaly, rebound, tenderness


Rectal exam: PRESENT: deferred


Extremities exam: PRESENT: full ROM, other - Left lower extremity with the dres

sing.  Unable to assess the wound..  ABSENT: calf tenderness, clubbing, pedal 

edema


Neurological exam: PRESENT: alert, awake, oriented to person, oriented to place,

oriented to time, oriented to situation, CN II-XII grossly intact.  ABSENT: 

motor sensory deficit





Results


Laboratory Results: 


                                        





                                 10/13/19 04:45 





                                 10/13/19 04:45 





                                        





10/10/19 15:20   Clean Catch Midstream   Urine Culture - Final


                            Yeast, Not Candida Albicans


                            Mixed Urogenital Bea


10/10/19 19:20   Foot - Left   Gram Stain - Final


10/10/19 19:20   Foot - Left   Wound Culture - Final


                            Proteus Mirabilis


                            Group B Beta Streptococcus


                            No Anaerobic Organisms





                                        











  10/10/19 10/10/19 10/11/19





  21:38 21:38 03:39


 


Creatine Kinase  80   88


 


CK-MB (CK-2)   0.56 


 


Troponin I   0.032 


 


NT-Pro-B Natriuret Pep   














  10/11/19 10/11/19 10/11/19





  03:39 09:35 09:35


 


Creatine Kinase   91 


 


CK-MB (CK-2)  0.90   0.92


 


Troponin I  0.021   0.016


 


NT-Pro-B Natriuret Pep  2750 H  











Impressions: 


                                        





Chest X-Ray  10/10/19 14:34


IMPRESSION:  NO ACUTE RADIOGRAPHIC FINDING IN THE CHEST.


 














Assessment and Plan





- Diagnosis


(1) Diabetic infection of left foot


Is this a current diagnosis for this admission?: Yes   


Plan: 


10/10/2019-patient is going to be admitted to [facility] as an inpatient.  To start 

her on IV vancomycin and Zosyn blood cultures wound cultures are requested.  MRI

of the left lower extremity was requested.  ID consult was requested.  Surgical 

consult was requested.  Started on morphine 1 mg IV every 4 as needed.  GI 

prophylaxis initiated.  DVT prophylaxis with heparin 5000 units every 8 hours 

requested.  To start her on insulin sliding scale.  Place a diabetic diet.  

Physical therapy consult on  consult is requested.





10/11/2019-patient admitted with left foot gas gangrene.  Status post removal of

the left great toe and extensive debridement of the wound.  As per the ID 

patient need a long-term IV antibiotic therapy.  No acute events in the last 24 

hours.  Afebrile.  presently on IV vancomycin and Zosyn.





10/12/2019-patient was admitted with a left foot gas gangrene and extensive 

debridement was done left great toe was removed.  ID recommendation is patient 

need 6 weeks of IV antibiotic therapy.  Wound cultures came back positive for 

Proteus mirabilis and pansensitive.  Blood cultures are negative.  Arterial 

Doppler was requested today.





10/13/2019-status post left great toe removal and extensive debridement for gas 

gangrene of the left foot.  As per ID patient need 6 weeks of IV antibiotic 

therapy.  Wound cultures came back positive for Proteus mirabilis pansensitive. 

Arterial Doppler was requested to check the status of the peripheral vascular 

disease.  Test is pending.





10/14/2019-patient admitted with left foot gas gangrene and diabetic foot ulcers

status post left toe amputation and extensive debridement as per the surgical 

team there is a deterioration of the surgical site wound and patient may need a 

further amputation I spoke to Dr. Higgins this morning because the patient on 

Eliquis he wants to wait at least for 24 to 48 hours for surgical debridement.








(2) Fever


Qualifiers: 


   Fever type: unspecified   Qualified Code(s): R50.9 - Fever, unspecified   


Is this a current diagnosis for this admission?: Yes   





(3) Leukocytosis


Qualifiers: 


   Leukocytosis type: bandemia   Qualified Code(s): D72.825 - Bandemia   


Is this a current diagnosis for this admission?: Yes   


Plan: 


10/10/2019-high WBC count most likely secondary to left foot cellulitis with 

underlying possible osteomyelitis.  Started on vancomycin and Zosyn cultures are

requested.  MRI of the left lower extremity was requested to rule out 

osteomyelitis.





10/12/2019-patient came in with elevated WBC count most likely secondary to left

foot cellulitis with gas gangrene and associated osteomyelitis.





10/13/2019-patient came in with elevated WBC count today his WBC count is 10.1 

within normal limits.





10/14/2019-today's labs are pending.








(4) New onset atrial fibrillation


Is this a current diagnosis for this admission?: Yes   


Plan: 


10/10/2019-patient found to have new onset atrial fibrillation rate controlled. 

Consult patient with Dr. Ferrera is requested.  Possibly she need Eliquis 

down the line.





10/11/2019-patient came in with new onset atrial fibrillation in the morning 

heart rate is 134.  Patient was given Cardizem IV 10 mg 1 dose and the heart 

rate came down to 70s.  Because of the history of diabetes mellitus, 

hypertension in association with atrial fibrillation patient may need anticoagu

lation at the time of discharge.





10/12/2019-patient came in with new onset atrial fibrillation patient may be 

needed to be starting on anticoagulation with Eliquis.  


Surgical team is not planning for any procedures we can start her on Eliquis.





10/13/2019-heart rate is in the 50s.  Not on beta-blockers.  Was started on 

Eliquis.  Dr. Ferrera is planning to arrange for stress test tomorrow.





10/14/2019-patient has bifascicular heart block-RBBB plus left anterior 

fascicular block.  Heart rate is around 50s.  Eliquis will be on hold.  Plan is 

to continue amlodipine 9 and Cozaar at this time.  Maisha plans to do the 

Lexiscan stress test as an outpatient.








(5) HTN (hypertension)


Is this a current diagnosis for this admission?: No   


Plan: 


Patient has history of chronic essential hypertension.  She is on Cozaar at 

home.  Plan is to resume the medication during the hospital stay.  Latest blood 

pressure is 151/60.











10/11/2019-patient blood pressure is 123/53.  Plan to continue IV fluids.





10/12/2019-patient blood pressure today is 139/50.  Plan is to discontinue  IV 

fluids from today.





10/13/2019-blood pressure today is 120/45.  Stable.  Patient is off the IV 

fluids.  Plan is to continue the present management.





10/14/2019-blood pressure today is 145/58.  Stable.  Plan is to continue Cozaar 

and amlodipine.








(6) Obesity (BMI 30.0-34.9)


Is this a current diagnosis for this admission?: No   





- Time


Time Spent with patient: 25-34 minutes


Medications reviewed and adjusted accordingly: Yes


Anticipated discharge: SNF

## 2019-10-14 NOTE — PDOC PROGRESS REPORT
Subjective


Progress Note for:: 10/14/19


Reason For Visit: 


DIABETIC INFECTION OF LEFT FOOT,LEUKOCYTOSIS,FEVER








Physical Exam


Vital Signs: 


                                        











Temp Pulse Resp BP Pulse Ox


 


 98.2 F   53 L  17   165/63 H  97 


 


 10/14/19 08:15  10/14/19 08:15  10/14/19 08:15  10/14/19 08:15  10/14/19 08:15








                                 Intake & Output











 10/13/19 10/14/19 10/15/19





 06:59 06:59 06:59


 


Intake Total 2350 2420 


 


Output Total 1200 1900 


 


Balance 1150 520 


 


Weight 86.2 kg 84.1 kg 











General appearance: PRESENT: no acute distress


Head exam: PRESENT: normocephalic


Eye exam: PRESENT: EOMI


Ear exam: PRESENT: normal external ear exam


Mouth exam: PRESENT: moist


Neck exam: PRESENT: full ROM


Respiratory exam: PRESENT: clear to auscultation belinda


Cardiovascular exam: PRESENT: RRR


Pulses: PRESENT: normal radial pulses, normal femoral pulses, other - weak bilat

distal pedal pulses


GI/Abdominal exam: PRESENT: soft


Rectal exam: PRESENT: deferred


Extremities exam: PRESENT: other - left great toe stump still appears necrotic, 

no expressible pus.


Musculoskeletal exam: PRESENT: full ROM


Neurological exam: PRESENT: alert, awake, oriented to person, oriented to place


Psychiatric exam: PRESENT: appropriate affect, depressed


Focused psych exam: PRESENT: other


Skin exam: PRESENT: dry





Results


Laboratory Results: 


                                        





                                 10/13/19 04:45 





                                 10/13/19 04:45 





                                        





10/10/19 15:20   Clean Catch Midstream   Urine Culture - Final


                            Yeast, Not Candida Albicans


                            Mixed Urogenital Bea


10/10/19 19:20   Foot - Left   Gram Stain - Final


10/10/19 19:20   Foot - Left   Wound Culture - Final


                            Proteus Mirabilis


                            Group B Beta Streptococcus


                            No Anaerobic Organisms





                                        











  10/10/19 10/10/19 10/11/19





  21:38 21:38 03:39


 


Creatine Kinase  80   88


 


CK-MB (CK-2)   0.56 


 


Troponin I   0.032 


 


NT-Pro-B Natriuret Pep   














  10/11/19 10/11/19 10/11/19





  03:39 09:35 09:35


 


Creatine Kinase   91 


 


CK-MB (CK-2)  0.90   0.92


 


Troponin I  0.021   0.016


 


NT-Pro-B Natriuret Pep  2750 H  











Impressions: 


                                        





Chest X-Ray  10/10/19 14:34


IMPRESSION:  NO ACUTE RADIOGRAPHIC FINDING IN THE CHEST.


 














Assessment & Plan





- Diagnosis


(1) Diabetic infection of left foot


Is this a current diagnosis for this admission?: Yes   





- Time


Time Spent with patient: 25-34 minutes





- Plan Summary


Plan Summary: 





left great toe stump still looks necrotic


long discussion with pt and daughter about a transmetatarsal 


amputation.


pt took sarah this am


I doubt this necrotic will improve


will discuss with surgicalist in am.

## 2019-10-15 LAB
ADD MANUAL DIFF: NO
ALBUMIN SERPL-MCNC: 3.5 G/DL (ref 3.5–5)
ALP SERPL-CCNC: 78 U/L (ref 38–126)
ANION GAP SERPL CALC-SCNC: 10 MMOL/L (ref 5–19)
AST SERPL-CCNC: 17 U/L (ref 14–36)
BASOPHILS # BLD AUTO: 0.1 10^3/UL (ref 0–0.2)
BASOPHILS NFR BLD AUTO: 0.8 % (ref 0–2)
BILIRUB DIRECT SERPL-MCNC: 0.1 MG/DL (ref 0–0.4)
BILIRUB SERPL-MCNC: 0.5 MG/DL (ref 0.2–1.3)
BUN SERPL-MCNC: 16 MG/DL (ref 7–20)
CALCIUM: 9 MG/DL (ref 8.4–10.2)
CHLORIDE SERPL-SCNC: 106 MMOL/L (ref 98–107)
CO2 SERPL-SCNC: 25 MMOL/L (ref 22–30)
EOSINOPHIL # BLD AUTO: 0.4 10^3/UL (ref 0–0.6)
EOSINOPHIL NFR BLD AUTO: 3.4 % (ref 0–6)
ERYTHROCYTE [DISTWIDTH] IN BLOOD BY AUTOMATED COUNT: 15.6 % (ref 11.5–14)
GLUCOSE SERPL-MCNC: 132 MG/DL (ref 75–110)
HCT VFR BLD CALC: 34.1 % (ref 36–47)
HGB BLD-MCNC: 11.1 G/DL (ref 12–15.5)
LYMPHOCYTES # BLD AUTO: 1.3 10^3/UL (ref 0.5–4.7)
LYMPHOCYTES NFR BLD AUTO: 10.7 % (ref 13–45)
MCH RBC QN AUTO: 26 PG (ref 27–33.4)
MCHC RBC AUTO-ENTMCNC: 32.5 G/DL (ref 32–36)
MCV RBC AUTO: 80 FL (ref 80–97)
MONOCYTES # BLD AUTO: 1 10^3/UL (ref 0.1–1.4)
MONOCYTES NFR BLD AUTO: 7.9 % (ref 3–13)
NEUTROPHILS # BLD AUTO: 9.4 10^3/UL (ref 1.7–8.2)
NEUTS SEG NFR BLD AUTO: 77.2 % (ref 42–78)
PLATELET # BLD: 356 10^3/UL (ref 150–450)
POTASSIUM SERPL-SCNC: 3.7 MMOL/L (ref 3.6–5)
PROT SERPL-MCNC: 7.3 G/DL (ref 6.3–8.2)
RBC # BLD AUTO: 4.25 10^6/UL (ref 3.72–5.28)
TOTAL CELLS COUNTED % (AUTO): 100 %
WBC # BLD AUTO: 12.2 10^3/UL (ref 4–10.5)

## 2019-10-15 RX ADMIN — PIPERACILLIN AND TAZOBACTAM SCH MLS/HR: 3; .375 INJECTION, POWDER, LYOPHILIZED, FOR SOLUTION INTRAVENOUS; PARENTERAL at 20:16

## 2019-10-15 RX ADMIN — ATORVASTATIN CALCIUM SCH MG: 20 TABLET, FILM COATED ORAL at 21:20

## 2019-10-15 RX ADMIN — VANCOMYCIN HYDROCHLORIDE SCH MLS/HR: 1 INJECTION, POWDER, LYOPHILIZED, FOR SOLUTION INTRAVENOUS at 13:23

## 2019-10-15 RX ADMIN — VANCOMYCIN HYDROCHLORIDE SCH MLS/HR: 1 INJECTION, POWDER, LYOPHILIZED, FOR SOLUTION INTRAVENOUS at 21:20

## 2019-10-15 RX ADMIN — Medication SCH ML: at 21:21

## 2019-10-15 RX ADMIN — APIXABAN SCH: 5 TABLET, FILM COATED ORAL at 22:05

## 2019-10-15 RX ADMIN — PIPERACILLIN AND TAZOBACTAM SCH MLS/HR: 3; .375 INJECTION, POWDER, LYOPHILIZED, FOR SOLUTION INTRAVENOUS; PARENTERAL at 15:13

## 2019-10-15 RX ADMIN — Medication SCH ML: at 05:53

## 2019-10-15 RX ADMIN — AMLODIPINE BESYLATE SCH MG: 2.5 TABLET ORAL at 09:08

## 2019-10-15 RX ADMIN — PANTOPRAZOLE SODIUM SCH MG: 40 TABLET, DELAYED RELEASE ORAL at 16:36

## 2019-10-15 RX ADMIN — LOSARTAN POTASSIUM SCH: 50 TABLET, FILM COATED ORAL at 21:18

## 2019-10-15 RX ADMIN — VANCOMYCIN HYDROCHLORIDE SCH MLS/HR: 1 INJECTION, POWDER, LYOPHILIZED, FOR SOLUTION INTRAVENOUS at 05:53

## 2019-10-15 RX ADMIN — PIPERACILLIN AND TAZOBACTAM SCH MLS/HR: 3; .375 INJECTION, POWDER, LYOPHILIZED, FOR SOLUTION INTRAVENOUS; PARENTERAL at 08:54

## 2019-10-15 RX ADMIN — COLLAGENASE SANTYL SCH APPLIC: 250 OINTMENT TOPICAL at 17:22

## 2019-10-15 RX ADMIN — ASPIRIN 325 MG ORAL TABLET SCH: 325 PILL ORAL at 09:12

## 2019-10-15 RX ADMIN — Medication SCH ML: at 13:25

## 2019-10-15 RX ADMIN — PANTOPRAZOLE SODIUM SCH MG: 40 TABLET, DELAYED RELEASE ORAL at 05:53

## 2019-10-15 RX ADMIN — INSULIN HUMAN SCH UNIT: 100 INJECTION, SOLUTION PARENTERAL at 21:21

## 2019-10-15 RX ADMIN — INSULIN HUMAN SCH: 100 INJECTION, SOLUTION PARENTERAL at 11:45

## 2019-10-15 RX ADMIN — COLLAGENASE SANTYL SCH APPLIC: 250 OINTMENT TOPICAL at 10:35

## 2019-10-15 RX ADMIN — INSULIN HUMAN SCH: 100 INJECTION, SOLUTION PARENTERAL at 08:38

## 2019-10-15 RX ADMIN — INSULIN HUMAN SCH: 100 INJECTION, SOLUTION PARENTERAL at 16:18

## 2019-10-15 RX ADMIN — PIPERACILLIN AND TAZOBACTAM SCH MLS/HR: 3; .375 INJECTION, POWDER, LYOPHILIZED, FOR SOLUTION INTRAVENOUS; PARENTERAL at 03:58

## 2019-10-15 NOTE — PDOC PROGRESS REPORT
Subjective


Progress Note for:: 10/15/19


Subjective:: 





Aggressive left foot debridement including complete amputation of left great 

toe, and webspace skin, soft tissue and tendons





64 year old female history of type 2 diabetes mellitus, hypertension, 

hypercholesteremia, left carotid endarterectomy came to the emergency room with 

complaints of left lower leg/left foot erythema and redness and ulceration.  She

went to see her pediatrician for callus removal as an outpatient and minor 

procedure was done on the left foot started having the redness increasing pain 

from yesterday decided to came to the emergency room for further evaluation.  

She has a fever of 103 in the emergency room with a WBC count of 20,000.  X-rays

done this morning with a different account shows gas formation in the right 

foot.  Consultation with Dr. Bailey is requested and also found to have new 

onset atrial fibrillation in the emergency room consultation with Dr. Ferrera

as requested.








10/11/[id number]-kmef-old female admitted with diabetic foot ulcer and gas gangrene

involving the left foot.  Status post removal of left great toe and extensive 

debridement of the wound.





10/12/2019-patient is comfortable in the bed communicating well.  Eating her 

breakfast.  No complaints.  The wound cultures came back positive for Proteus 

pansensitive.





10/13/2019-no acute events in the last 24 hours.  Patient was started on Eliquis

yesterday for atrial fibrillation.  Surgical follow-up was done Dr. Cotton spoke

to me his impression is patient may need a minor debridement other than that no 

surgical procedure was planned.  Patient is comfortable in the bed communicating

well.  Heart rate in the 50s dropping down to 40s last night.  Patient is not on

beta-blockers.  She is scheduled for stress test tomorrow.  Dr. Ferrera is on

board.





10/14/2019-no acute events in last 24 hours.  Afebrile.  As per the surgical 

team the surgical site looks worsening deteriorated patient may need further 

amputation.  I spoke to Dr. Higgins this morning ,,because the patient is on 

Eliquis  he wants to wait until Wednesday.





10/15/2019-no acute events in the last 24 hours.  Patient is afebrile.  Surgery 

plan to do further debridements tomorrow Eliquis is on hold.  No acute events in

the last 24 hours.  Patient has bifascicular heart block Dr. Ferrera is on 

board.  Cultures came back positive for Proteus and group B strep.


Reason For Visit: 


DIABETIC INFECTION OF LEFT FOOT,LEUKOCYTOSIS,FEVER








Physical Exam


Vital Signs: 


                                        











Temp Pulse Resp BP Pulse Ox


 


 98.3 F   54 L  18   148/58 H  97 


 


 10/15/19 08:26  10/15/19 08:26  10/15/19 08:26  10/15/19 08:26  10/15/19 08:26








                                 Intake & Output











 10/14/19 10/15/19 10/16/19





 06:59 06:59 06:59


 


Intake Total 2420 1620 


 


Output Total 1900  


 


Balance 520 1620 


 


Weight 84.1 kg 84.2 kg 











General appearance: PRESENT: no acute distress, cooperative, obese


Head exam: PRESENT: atraumatic


Eye exam: PRESENT: PERRLA


Mouth exam: PRESENT: moist, tongue midline


Teeth exam: PRESENT: poor dentation


Neck exam: ABSENT: carotid bruit, JVD, lymphadenopathy, thyromegaly


Respiratory exam: PRESENT: decreased breath sounds


Cardiovascular exam: PRESENT: bradycardia, systolic murmur


GI/Abdominal exam: PRESENT: normal bowel sounds, soft.  ABSENT: distended, 

guarding, mass, organolmegaly, rebound, tenderness


Extremities exam: PRESENT: full ROM.  ABSENT: calf tenderness, clubbing, pedal 

edema


Neurological exam: PRESENT: alert, awake, oriented to person, oriented to place,

oriented to time, oriented to situation, CN II-XII grossly intact.  ABSENT: 

motor sensory deficit


Psychiatric exam: PRESENT: appropriate affect, normal mood.  ABSENT: homicidal 

ideation, suicidal ideation





Results


Laboratory Results: 


                                        





                                 10/15/19 05:15 





                                 10/15/19 05:15 





                                        











  10/15/19 10/15/19





  05:15 05:15


 


WBC  12.2 H 


 


RBC  4.25 


 


Hgb  11.1 L 


 


Hct  34.1 L 


 


MCV  80 


 


MCH  26.0 L 


 


MCHC  32.5 


 


RDW  15.6 H 


 


Plt Count  356 


 


Seg Neutrophils %  77.2 


 


Sodium   141.3


 


Potassium   3.7


 


Chloride   106


 


Carbon Dioxide   25


 


Anion Gap   10


 


BUN   16


 


Creatinine   0.86


 


Est GFR (African Amer)   > 60


 


Glucose   132 H


 


Calcium   9.0


 


Magnesium   2.0


 


Total Bilirubin   0.5


 


AST   17


 


Alkaline Phosphatase   78


 


Total Protein   7.3


 


Albumin   3.5








                                        











  10/10/19 10/10/19 10/11/19





  21:38 21:38 03:39


 


Creatine Kinase  80   88


 


CK-MB (CK-2)   0.56 


 


Troponin I   0.032 


 


NT-Pro-B Natriuret Pep   














  10/11/19 10/11/19 10/11/19





  03:39 09:35 09:35


 


Creatine Kinase   91 


 


CK-MB (CK-2)  0.90   0.92


 


Troponin I  0.021   0.016


 


NT-Pro-B Natriuret Pep  2750 H  











Impressions: 


                                        





Chest X-Ray  10/10/19 14:34


IMPRESSION:  NO ACUTE RADIOGRAPHIC FINDING IN THE CHEST.


 














Assessment and Plan





- Diagnosis


(1) Diabetic infection of left foot


Is this a current diagnosis for this admission?: Yes   


Plan: 


10/10/2019-patient is going to be admitted to [facility] as an inpatient.  To start 

her on IV vancomycin and Zosyn blood cultures wound cultures are requested.  MRI

of the left lower extremity was requested.  ID consult was requested.  Surgical 

consult was requested.  Started on morphine 1 mg IV every 4 as needed.  GI 

prophylaxis initiated.  DVT prophylaxis with heparin 5000 units every 8 hours 

requested.  To start her on insulin sliding scale.  Place a diabetic diet.  

Physical therapy consult on  consult is requested.





10/11/2019-patient admitted with left foot gas gangrene.  Status post removal of

the left great toe and extensive debridement of the wound.  As per the ID 

patient need a long-term IV antibiotic therapy.  No acute events in the last 24 

hours.  Afebrile.  presently on IV vancomycin and Zosyn.





10/12/2019-patient was admitted with a left foot gas gangrene and extensive 

debridement was done left great toe was removed.  ID recommendation is patient 

need 6 weeks of IV antibiotic therapy.  Wound cultures came back positive for 

Proteus mirabilis and pansensitive.  Blood cultures are negative.  Arterial 

Doppler was requested today.





10/13/2019-status post left great toe removal and extensive debridement for gas 

gangrene of the left foot.  As per ID patient need 6 weeks of IV antibiotic 

therapy.  Wound cultures came back positive for Proteus mirabilis pansensitive. 

Arterial Doppler was requested to check the status of the peripheral vascular 

disease.  Test is pending.





10/14/2019-patient admitted with left foot gas gangrene and diabetic foot ulcers

status post left toe amputation and extensive debridement as per the surgical 

team there is a deterioration of the surgical site wound and patient may need a 

further amputation I spoke to Dr. Higgins this morning because the patient on 

Eliquis he wants to wait at least for 24 to 48 hours for surgical debridement.





10/15/2019-elderly female with history of diabetes mellitus admitted with left 

great toe gangrene he initially had a left great toe amputation and extensive 

debridement patient is going back to the OR again tomorrow.  Eliquis is on hold 

for the last 48 hours.  Blood cultures are positive for Proteus and group B 

streptococcus.  Arterial Doppler of the lower extremity was done report is 

pending.  pt is receiving vancomycin and Zosyn at this time.








(2) Fever


Qualifiers: 


   Fever type: unspecified   Qualified Code(s): R50.9 - Fever, unspecified   


Is this a current diagnosis for this admission?: Yes   


Plan: 


10/10/2019-patient came in with fever of 103.  Due to left foot cellulitis.  Sta

rted on vancomycin and Zosyn, blood cultures urine cultures wound cultures are 

requested.





10/11/2019-patient is afebrile T-max is 98.5 fever most likely secondary to left

foot cellulitis with associated possible osteomyelitis and evidence of gas 

gangrene.





10/12/2019-patient came in with a fever of 103 which was resolved now.  T-max is

97.6.





10/15/2019-T-max is 97.8 fever secondary to left foot gangrene resolved.








(3) Leukocytosis


Qualifiers: 


   Leukocytosis type: bandemia   Qualified Code(s): D72.825 - Bandemia   


Is this a current diagnosis for this admission?: Yes   


Plan: 


10/10/2019-high WBC count most likely secondary to left foot cellulitis with 

underlying possible osteomyelitis.  Started on vancomycin and Zosyn cultures are

requested.  MRI of the left lower extremity was requested to rule out 

osteomyelitis.





10/12/2019-patient came in with elevated WBC count most likely secondary to left

foot cellulitis with gas gangrene and associated osteomyelitis.





10/13/2019-patient came in with elevated WBC count today his WBC count is 10.1 

within normal limits.





10/14/2019-today's labs are pending.








(4) New onset atrial fibrillation


Is this a current diagnosis for this admission?: Yes   


Plan: 


10/10/2019-patient found to have new onset atrial fibrillation rate controlled. 

Consult patient with Dr. Ferrera is requested.  Possibly she need Eliquis 

down the line.





10/11/2019-patient came in with new onset atrial fibrillation in the morning 

heart rate is 134.  Patient was given Cardizem IV 10 mg 1 dose and the heart 

rate came down to 70s.  Because of the history of diabetes mellitus, hypertensio

n in association with atrial fibrillation patient may need anticoagulation at 

the time of discharge.





10/12/2019-patient came in with new onset atrial fibrillation patient may be 

needed to be starting on anticoagulation with Eliquis.  


Surgical team is not planning for any procedures we can start her on Eliquis.





10/13/2019-heart rate is in the 50s.  Not on beta-blockers.  Was started on 

Eliquis.  Dr. Ferrera is planning to arrange for stress test tomorrow.





10/14/2019-patient has bifascicular heart block-RBBB plus left anterior 

fascicular block.  Heart rate is around 50s.  Eliquis will be on hold.  Plan is 

to continue amlodipine 9 and Cozaar at this time.  Maisha plans to do the 

Lexiscan stress test as an outpatient.





10/15/2019-patient found to have a new onset atrial fibrillation and 

bifascicular block, Eliquis on hold for possible surgery tomorrow.








(5) HTN (hypertension)


Is this a current diagnosis for this admission?: No   


Plan: 


Patient has history of chronic essential hypertension.  She is on Cozaar at [facility].  Plan is to resume the medication during the hospital stay.  Latest blood 

pressure is 151/60.











10/11/2019-patient blood pressure is 123/53.  Plan to continue IV fluids.





10/12/2019-patient blood pressure today is 139/50.  Plan is to discontinue  IV 

fluids from today.





10/13/2019-blood pressure today is 120/45.  Stable.  Patient is off the IV 

fluids.  Plan is to continue the present management.





10/14/2019-blood pressure today is 145/58.  Stable.  Plan is to continue Cozaar 

and amlodipine.








(6) Obesity (BMI 30.0-34.9)


Is this a current diagnosis for this admission?: No   





- Time


Time Spent with patient: 25-34 minutes


Medications reviewed and adjusted accordingly: Yes


Anticipated discharge: SNF

## 2019-10-15 NOTE — XCELERA REPORT
[address] [address]

                               Tel: [phone]

                               Fax: [phone]



                      Lower Extremity Arterial Evaluation

_______________________________________________________________________________



Name: YARA CARPENTER

MRN: [id number]                                  Age: 64 yrs

Gender: Female                                   : 1955

Patient Status: Inpatient                        Patient Location: [address] [place] [address]

Account #: [id number]

Study Date: 10/14/2019 04:08 PM

                       Accession #: [id number]

_______________________________________________________________________________

Procedure: A color flow and duplex scan of the lower extremity arteries was

performed on the left with velocity and waveform anaylsis.

Reason For Study: peripheral vascular disease







Ordering Physician: MARIEL MACIAS

Performed By: Jasmin Romo

_______________________________________________________________________________

_______________________________________________________________________________





Measurements and Calculations



                                     Right  Left

                     CFA PSV               152.4 cm/sec

                     Prox PFA PSV          147.1 cm/sec

                     Prox SFA PSV           55.5 cm/sec

                     Mid SFA PSV           -55.5 cm/sec

                     Dist SFA PSV          -37.0 cm/sec

                     Prox Pop A PSV         73.8 cm/sec

                     Dist Pop A PSV        -82.9 cm/sec

                     Dist TRAY PSV           50.6 cm/sec

                     Dist PTA PSV           64.6 cm/sec

                     Luis M Pedis PSV   51.9   19.3 cm/sec



_______________________________________________________________________________



Left Side Arterial Evaluation

Normal velocity and biiphasic waveform noted in the Common Femoral artery.



Triphasic with normal velocity in the Deep Femoral .



Monophasic with normal velocity , minimal spectral broadening from the distal

Femoral to infrageniculate vessels.



Dorsalis Pedis has low flow, monophasic,



Ankle Brachial index not done.



_______________________________________________________________________________

Interpretation Summary

Severe hemodynamically significant lesions in the left lower extremity only,

on duplex imaging, at rest. Duplex findings show no focal stenosis. Possible

inflow disease, sparing of the Deep Femoral, implied lesion in the Femoral

artery.

_______________________________________________________________________________

Electronically signed by:      Lennox Williams      on 10/15/2019 11:11 AM



CC: MARIEL MACIAS

>

Williams, Lennox

## 2019-10-15 NOTE — PROGRESS NOTE
Provider Note


Provider Note: 


CARDIOLOGY PROGRESS NOTE by Dr. Rhonda Ferrera on 10/15/2019.


SUBJECTIVE: The patient denies any chest pain or discomfort.  She continues to 

be bradycardic.  She continues to be in chronic atrial fibrillation.  There is 

no PND orthopnea.  The patient's left foot diabetic ulcer is not healing and the

patient's found to have severe peripheral vascular disease.  There is no TIA CVA

symptoms.  The patient denies any dizziness or near syncope or syncope.





PHYSICAL EXAMINATION: The patient is mildly obese.  In no acute distress.


                                                                Selected Entries











  10/15/19





  08:26


 


Temperature 98.3 F


 


Temperature Oral





Source 


 


Pulse Rate 54 L


 


Respiratory 18





Rate 


 


Blood Pressure 148/58 H


 


Blood Pressure 88





Mean 


 


BP Location Right Arm


 


BP Position Supine


 


O2 Sat by Pulse 97





Oximetry 


 


Oxygen Delivery Room Air





Method 





HEAD: Is atraumatic normocephalic.  EYES: Pupils are equal round regular 

reactive to light and accommodation.  There is no clinical pallor.  There is no 

scleral icterus.  External ocular movements are normal.  EARS: Tympanic 

membranes are intact.  External auditory canals are clear.  NOSE: Nasal mucous 

membranes are not inflamed.  There is no deviated nasal septum.  MOUTH: Mucous 

membranes of mouth are moist.  Tongue is moist.  There is no ulcers.  There is 

no bleeding from the gums.  THROAT: There is no redness of the oropharynx.  

There is no exudates in the throat.  SKIN: There is no petechia or ecchymosis.  

There is no skin rashes or skin lesions.  NECK: Is supple.  There is no JVD.  

Carotids are equal there well-healed scar of left carotid endarterectomy, and 

faint bilateral bruits.  There is no lymphadenopathy.  There is no goiter.  

There is no accessory muscles of respiration in use.  Trachea central.  LUNGS: 

There is diminished air entry and prolonged expiration.  On percussion there is 

hyperresonance.  There is scattered rhonchi present.  There is no rales or 

wheezing.  There is no chest wall tenderness on palpation.  HEART: S1-S2 is 

heard.  S1 is of variable intensity.  There is no S3 gallop.  There is no S4 

gallop.  There is systolic murmur left sternal border and the apex, without 

radiation.  There is no murmur of aortic stenosis.  Prosthetic aortic valve 

click heard crisply.  There is no aortic regurgitation murmur..  There is no 

rub.  ABDOMEN: Is  Nontender.  There is no hepatosplenomegaly.  Bowel sounds are

well heard.  EXTREMITIES: Femorals are deep.  Femorals are slightly diminished. 

There is no femoral bruits.  There is no pedal edema.  There is no DVT or 

cellulitis.  Leg pulses are diminished.  There is no cyanosis or clubbing.  

Capillary refill is normal.  There is no calf tenderness.  There is dressing in 

the left leg.  CNS: The patient is conscious awake alert oriented x3 with no 

focal deficits.  PSYCHIATRIC: The patient judgment and insight are intact her 

affect is normal.





ECHO: Shows normal left ventricular systolic function with no wall motion 

normality.  There is mild aortic stenosis.  Please see report


                                        





Chest X-Ray  10/10/19 14:34


IMPRESSION:  NO ACUTE RADIOGRAPHIC FINDING IN THE CHEST.


 








Aorta w/Runoff CTA  10/15/19 00:00


IMPRESSION:  EXTENSIVE ATHEROSCLEROSIS.  SEVERAL FOCAL AREAS OF STENOSIS IN THE 

RIGHT SUPERFICIAL FEMORAL ARTERY WITHOUT OCCLUSION.  COMPLETE OCCLUSION OF THE 

LEFT SUPERFICIAL FEMORAL ARTERY IN THE MID THIGH WITH RECONSTITUTION VIA 

COLLATERALS DISTALLY.


                              Labs- All tests 24 hr











  10/14/19 10/15/19 10/15/19





  21:42 05:15 05:15


 


WBC   12.2 H 


 


RBC   4.25 


 


Hgb   11.1 L 


 


Hct   34.1 L 


 


MCV   80 


 


MCH   26.0 L 


 


MCHC   32.5 


 


RDW   15.6 H 


 


Plt Count   356 


 


Lymph % (Auto)   10.7 L 


 


Mono % (Auto)   7.9 


 


Eos % (Auto)   3.4 


 


Baso % (Auto)   0.8 


 


Absolute Neuts (auto)   9.4 H 


 


Absolute Lymphs (auto)   1.3 


 


Absolute Monos (auto)   1.0 


 


Absolute Eos (auto)   0.4 


 


Absolute Basos (auto)   0.1 


 


Seg Neutrophils %   77.2 


 


Sodium    141.3


 


Potassium    3.7


 


Chloride    106


 


Carbon Dioxide    25


 


Anion Gap    10


 


BUN    16


 


Creatinine    0.86


 


Est GFR ( Amer)    > 60


 


Est GFR (MDRD) Non-Af    > 60


 


Glucose    132 H


 


POC Glucose  137 H  


 


Calcium    9.0


 


Magnesium    2.0


 


Total Bilirubin    0.5


 


Direct Bilirubin    0.1


 


Neonat Total Bilirubin    Not Reportable


 


Neonat Direct Bilirubin    Not Reportable


 


Neonat Indirect Bili    Not Reportable


 


AST    17


 


ALT    12


 


Alkaline Phosphatase    78


 


Total Protein    7.3


 


Albumin    3.5














  10/15/19 10/15/19 10/15/19





  08:27 12:12 16:08


 


WBC   


 


RBC   


 


Hgb   


 


Hct   


 


MCV   


 


MCH   


 


MCHC   


 


RDW   


 


Plt Count   


 


Lymph % (Auto)   


 


Mono % (Auto)   


 


Eos % (Auto)   


 


Baso % (Auto)   


 


Absolute Neuts (auto)   


 


Absolute Lymphs (auto)   


 


Absolute Monos (auto)   


 


Absolute Eos (auto)   


 


Absolute Basos (auto)   


 


Seg Neutrophils %   


 


Sodium   


 


Potassium   


 


Chloride   


 


Carbon Dioxide   


 


Anion Gap   


 


BUN   


 


Creatinine   


 


Est GFR ( Amer)   


 


Est GFR (MDRD) Non-Af   


 


Glucose   


 


POC Glucose  152 H  109  136 H


 


Calcium   


 


Magnesium   


 


Total Bilirubin   


 


Direct Bilirubin   


 


Neonat Total Bilirubin   


 


Neonat Direct Bilirubin   


 


Neonat Indirect Bili   


 


AST   


 


ALT   


 


Alkaline Phosphatase   


 


Total Protein   


 


Albumin   











 IMPRESSION/RECOMMENDATION:


1.   Unknown duration of atrial fibrillation.  Patient has noticed prior history

of our knowledge of being in this rhythm.  And she is asymptomatic.  Hence need 

to assume that this is more than 48 hours.  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

and AV tali disease.  Would recommend a 30-day event monitor as an outpatient.


2.  Status post debridement and amputation of left toe diabetic ulcer.


3.  Hypertension: Blood pressure well controlled


4.  Diabetes mellitus insulin-dependent.


5.  CORRECTED DESHAUN VASC-2 score is 4.  . The patient was  on Eliquis.This has 

been held due to patient's impending left toe amputation.


6.  Peripheral vascular disease: History of left carotid endarterectomy.  This 

increases the possibility of the patient having coronary artery disease.  Later 

would recommend IV Lexiscan Cardiolite stress test.  This can be done as an 

outpatient.  The patient's lower extremity CTA shows significant occlusion of 

the left common femoral artery.  This probably is contributing to the nonhealing

of the patient's diabetic ulcer, and will also interfere with further healing 

post amputation.  Hence the patient is being transferred to [facility] under Dr. Daniels for further management of the patient's 

left lower extremity pathology.


7.  Systolic murmur: No definite clinical evidence of significant aortic 

stenosis.  There is mild aortic stenosis.  This does not seem to be 

hemodynamically significant.  Would recommend getting an echocardiogram to 

assess LV ejection fraction, mitral valve disorder and aortic valve disorder.


8.Abnormal EKG with bifascicular block [right bundle branch block pattern and 

left anterior fascicular block.]  [RBBB and LAHB].


9.  Bradycardia, asymptomatic, with stable blood pressure, but with bifascicular

block


10. Mild Aortic Stenosis.  Although not hemodynamically significant, this could 

explain the patient's bradycardia with possibly calcification of the conduction 

system.  The patient in the near future most likely will need a permanent 

pacemaker.  This has been discussed with the patient.


Medications reviewed.  Management plan discussed with attending physician and 

also with the surgical list.  The patient being transferred to [facility].  Hence will sign off.  The patient will follow-up with 

me in the office if she so desires to.  Contact numbers given.

## 2019-10-15 NOTE — RADIOLOGY REPORT (SQ)
EXAM DESCRIPTION:  CTA ABD AORTA AND EXTREMITY



COMPLETED DATE/TIME:  10/15/2019 4:27 pm



REASON FOR STUDY:  severe peripheral vascular disease



COMPARISON:   None.



TECHNIQUE:  CT scan of the body and lower extremities performed with and without intravenous contrast
 using helical scanning technique with dynamic intravenous contrast injection. Images reviewed with l
tuan, soft tissue, and bone windows. Reconstructed coronal and sagittal MPR images reviewed. All image
s stored on PACS.

Advanced 3D imaging as volume-rendering, MIPs, SSD performed? yes

All CT scanners at this facility use dose modulation, iterative reconstruction, and/or weight based d
osing when appropriate to reduce radiation dose to as low as reasonably achievable (ALARA).

[facility]: Dose Right  [facility]: CareDose    [facility]: Dose Right    [facility]: Teradose 4D    [facility]: Smart Technologies



CONTRAST TYPE AND DOSE:  contrast/concentration: Isovue 350.00 mg/ml; Total Contrast Delivered: 100.0
 ml; Total Saline Delivered: 107.2 ml



RENAL FUNCTION:  BUN 16 creatinine 0.86.



LIMITATIONS:  None.



FINDINGS:  NON-CONTRASTED IMAGING: No significant renal or bladder calcifications. No other significa
nt organ calcifications.

POST-CONTRAST IMAGING:

AORTA AND VESSELS: No aortic aneurysm or dissection.  Extensive atherosclerosis and calcifications.  
Patent renal arteries.  Celiac axis and superior mesenteric artery not completely imaged.  Patent com
mon, internal, and external iliac arteries.  Scattered plaque and calcifications.

LUNG BASES: Not visualized.

LIVER: Normal size. No masses or dilated ducts.

SPLEEN: Normal size. No focal lesions.

PANCREAS: No masses. No significant calcifications. No adjacent inflammation or peripancreatic fluid 
collections. Pancreatic duct not dilated.

GALLBLADDER: No identified stones by CT criteria. No inflammatory changes to suggest cholecystitis.

ADRENAL GLANDS: No significant masses or asymmetry.

RIGHT KIDNEY AND URETER: No mass, calculi or urinary tract obstruction.

LEFT KIDNEY AND URETER: No mass, calculi or urinary tract obstruction.

RETROPERITONEUM: No retroperitoneal adenopathy, hemorrhage or masses.

BOWEL AND PERITONEAL CAVITY: No masses or inflammatory changes. No free fluid or peritoneal masses.

APPENDIX: Normal.

ABDOMINAL WALL: No masses. No hernias.

BONY STRUCTURES: No significant or acute findings.

3-D IMAGING: Confirms the above findings.

OTHER: No other significant finding.

LOWER EXTREMITIES:

RIGHT LEG:

FEMORAL ARTERIES: Scattered plaque and calcifications with several focal areas of stenosis in the sup
erficial femoral artery approaching 70 to 80%.  No occlusion.

POPLITEAL ARTERY: No aneurysm. No occlusions or significant stenoses.

TIBIOPERONEAL TRUNK AND RUNOFF VESSELS: No occlusions or significant stenoses.  Two-vessel runoff via
 the peroneal and posterior tibial arteries.

OTHER: Multiple prominent superficial venous varicosities, particularly in the calf.

LEFT LEG:

FEMORAL ARTERIES: Scattered plaque and calcifications.  Complete occlusion of the common femoral lou
ry in the mid thigh with reconstitution via collaterals distally.

POPLITEAL ARTERY: No aneurysm. No occlusions or significant stenoses.

TIBIOPERONEAL TRUNK AND RUNOFF VESSELS: No occlusions or significant stenoses.  Two-vessel runoff via
 the peroneal and posterior tibial arteries.  .

OTHER: No other significant finding.



IMPRESSION:  EXTENSIVE ATHEROSCLEROSIS.  SEVERAL FOCAL AREAS OF STENOSIS IN THE RIGHT SUPERFICIAL FEM
ORAL ARTERY WITHOUT OCCLUSION.  COMPLETE OCCLUSION OF THE LEFT SUPERFICIAL FEMORAL ARTERY IN THE MID 
THIGH WITH RECONSTITUTION VIA COLLATERALS DISTALLY.



TECHNICAL DOCUMENTATION:  JOB ID:  [id number]

Quality ID # 436: Final reports with documentation of one or more dose reduction techniques (e.g., Au
tomated exposure control, adjustment of the mA and/or kV according to patient size, use of iterative 
reconstruction technique)

 2011 [employer]- All Rights Reserved



Reading location - IP/workstation name: [email or web address]

## 2019-10-15 NOTE — PDOC PROGRESS REPORT
Subjective


Progress Note for:: 10/15/19


Subjective:: 





mild pains left foot amp site


Reason For Visit: 


DIABETIC INFECTION OF LEFT FOOT,LEUKOCYTOSIS,FEVER








Physical Exam


Vital Signs: 


                                        











Temp Pulse Resp BP Pulse Ox


 


 98.0 F   51 L  16   156/45 H  95 


 


 10/15/19 16:00  10/15/19 16:00  10/15/19 16:00  10/15/19 16:00  10/15/19 16:00








                                 Intake & Output











 10/14/19 10/15/19 10/16/19





 06:59 06:59 06:59


 


Intake Total 2420 1620 1660


 


Output Total 1900  400


 


Balance 520 1620 1260


 


Weight 84.1 kg 84.2 kg 











Exam: 





unable to palpate popliteal or ankle pulses left side


Amp left big toe looks red but dry.





Results


Laboratory Results: 


                                        





                                 10/15/19 05:15 





                                 10/15/19 05:15 





                                        











  10/15/19 10/15/19





  05:15 05:15


 


WBC  12.2 H 


 


RBC  4.25 


 


Hgb  11.1 L 


 


Hct  34.1 L 


 


MCV  80 


 


MCH  26.0 L 


 


MCHC  32.5 


 


RDW  15.6 H 


 


Plt Count  356 


 


Seg Neutrophils %  77.2 


 


Sodium   141.3


 


Potassium   3.7


 


Chloride   106


 


Carbon Dioxide   25


 


Anion Gap   10


 


BUN   16


 


Creatinine   0.86


 


Est GFR (African Amer)   > 60


 


Glucose   132 H


 


Calcium   9.0


 


Magnesium   2.0


 


Total Bilirubin   0.5


 


AST   17


 


Alkaline Phosphatase   78


 


Total Protein   7.3


 


Albumin   3.5








                                        





10/10/19 13:27   Blood   Blood Culture - Final


                            NO GROWTH IN 5 DAYS


10/10/19 11:55   Blood   Blood Culture - Final


                            NO GROWTH IN 5 DAYS





                                        











  10/10/19 10/10/19 10/11/19





  21:38 21:38 03:39


 


Creatine Kinase  80   88


 


CK-MB (CK-2)   0.56 


 


Troponin I   0.032 


 


NT-Pro-B Natriuret Pep   














  10/11/19 10/11/19 10/11/19





  03:39 09:35 09:35


 


Creatine Kinase   91 


 


CK-MB (CK-2)  0.90   0.92


 


Troponin I  0.021   0.016


 


NT-Pro-B Natriuret Pep  2750 H  











Impressions: 


                                        





Chest X-Ray  10/10/19 14:34


IMPRESSION:  NO ACUTE RADIOGRAPHIC FINDING IN THE CHEST.


 








Aorta w/Runoff CTA  10/15/19 00:00


IMPRESSION:  EXTENSIVE ATHEROSCLEROSIS.  SEVERAL FOCAL AREAS OF STENOSIS IN THE 

RIGHT SUPERFICIAL FEMORAL ARTERY WITHOUT OCCLUSION.  COMPLETE OCCLUSION OF THE 

LEFT SUPERFICIAL FEMORAL ARTERY IN THE MID THIGH WITH RECONSTITUTION VIA 

COLLATERALS DISTALLY.


 














Assessment & Plan





- Diagnosis


(1) PVD (peripheral vascular disease)


Is this a current diagnosis for this admission?: Yes   





(2) Diabetic infection of left foot


Is this a current diagnosis for this admission?: Yes   





- Time


Time Spent with patient: 15-24 minutes





- Inpatient Certification


Medical Necessity: Need for IV Antibiotics, Need for Surgery





- Plan Summary


Plan Summary: 





Arterial dupledx scan left leg showed severe proximal occlusive disease. Patient

is being followed by Vascular surgery in [place]


D/W Dr Maria who called Dr solomon in [place] who accepted patient but 

awaiting bed availability. Meantime,agree to do CT angio of aortobifemoral v

essells.


Continue IV antibiotics

## 2019-10-15 NOTE — XCELERA REPORT
[address] [address]

                               Tel: [phone]

                               Fax: [phone]



                      Transthoracic Echocardiogram Report

_______________________________________________________________________________



Name: YARA CARPENTER

MRN: [id number]                           Age: 64 yrs

Gender: Female                            : 1955

Patient Status: Inpatient                 Patient Location: [address] [place] [address]

Account #: [id number]

Study Date: 10/14/2019 01:43 PM

Accession #: [id number]

_______________________________________________________________________________



Height: 65 in        Weight: 190 lb        BSA: 1.9 m2

_______________________________________________________________________________

Procedure: A two-dimensional transthoracic echocardiogram with color flow and

Doppler was performed. The study was technically limited with all images being

suboptimal in quality.

Reason For Study: atrial fib



History: ATRIAL FIBRILLATION.

Ordering Physician: MARIEL MACIAS



Performed By: Susi Montez

_______________________________________________________________________________



Interpretation Summary

The left ventricle is normal in size.

There is normal left ventricular wall thickness.

LV EF is 65%

Left ventricular systolic function is normal.

The left ventricular wall motion is normal.

There is no thrombus.

Cannot assesss ASD mVSD ,or PFO.

The right ventricle is grossly normal size.

The right ventricle is not well visualized secondary to technical limitations

The left atrium is mildly dilated.

There is mild mitral leaflet calcification.

There is mild to moderate mitral annular calcification.

There is no evidence of mitral valve prolapse.

There is no vegetation seen on the mitral valve.

There is no mitral valve stenosis.

There is a trace amount of mitral regurgitation

There is no aortic valvular vegetation.

There is mild aortic stenosis

There is a peak gradient of 16.5 mm of Hg.

There is no LVOT obstruction.

There is a trace amount of aortic regurgitation

There is no tricuspid stenosis.

There is a trace amount of tricuspid regurgitation

Right ventricular systolic pressure is normal.

RVSP is 25 to 30 mm of HG , with RA mean of 5 to 10.

There is no pulmonic valvular stenosis.

There is no pulmonic valvular regurgitation.

The aortic root is normal size.

The inferior vena cava appeared normal and decreased > 50% with respiration

(RAP 5-10 mmHg)

There is no pericardial effusion.



MMode/2D Measurements & Calculations

RVDd: 3.7 cm        LVIDd: 5.0 cm      FS: 40.5 %         Ao root diam:

                                                          3.0 cm

IVSd: 0.88 cm       LVIDs: 3.0 cm      EDV(Teich):

                    LVPWd: 1.1 cm      119.5 ml           Ao root area:

                                       ESV(Teich): 34.7 ml7.1 cm2

                                       EF(Teich): 71.0 %

        _______________________________________________________________

EDV(MOD-sp4):       SV(MOD-sp4):

97.1 ml             62.8 ml

ESV(MOD-sp4):

34.3 ml

EF(MOD-sp4): 64.7 %



Doppler Measurements & Calculations

MV E max clara:       MV dec slope:        Ao V2 max:         LV V1 max P.4 cm/sec                             203.2 cm/sec       8.4 mmHg

MV A max clara:       747.8 cm/sec2        Ao max PG:         LV V1 max:

26.9 cm/sec         MV dec time:         16.5 mmHg          145.3 cm/sec

MV E/A: 6.2         0.22 sec



        _______________________________________________________________

PA V2 max:          PI end-d clara:        TR max clara:

105.4 cm/sec        74.4 cm/sec          224.5 cm/sec

PA max P.4 mmHg                      TR max P.5 mmHg



Left Ventricle

The left ventricle is normal in size. There is normal left ventricular wall

thickness. LV EF is 65%. Left ventricular systolic function is normal. LV

diastolic function could not be adequately assessed due to atrial fibrilation.

The left ventricular wall motion is normal. There is no thrombus. Cannot

assesss ASD mVSD ,or PFO.



Right Ventricle

The right ventricle is grossly normal size. The right ventricle is not well

visualized secondary to technical limitations.





Atria

The right atrium is normal. The left atrium is mildly dilated.



Mitral Valve

There is mild mitral leaflet calcification. There is mild to moderate mitral

annular calcification. There is no evidence of mitral valve prolapse. There is

no vegetation seen on the mitral valve. There is no mitral valve stenosis.

There is a trace amount of mitral regurgitation.



Aortic Valve

There is no aortic valvular vegetation. There is mild aortic stenosis. There

is a peak gradient of 16.5 mm of Hg. There is no LVOT obstruction. There is a

trace amount of aortic regurgitation.



Tricuspid Valve

There is no tricuspid stenosis. There is a trace amount of tricuspid

regurgitation. Right ventricular systolic pressure is normal. RVSP is 25 to 30

mm of HG , with RA mean of 5 to 10.





Pulmonic Valve

There is no pulmonic valvular stenosis. There is no pulmonic valvular

regurgitation.



Great Vessels

The aortic root is normal size. The inferior vena cava appeared normal and

decreased > 50% with respiration (RAP 5-10 mmHg).



Effusions

There is no pericardial effusion.





_______________________________________________________________________________

_______________________________________________________________________________

Electronically signed by:      Rhonda Ferrera      on 10/15/2019 10:13 PM



CC: MARIEL MACIAS Lakshmi

## 2019-10-16 LAB
ADD MANUAL DIFF: NO
ALBUMIN SERPL-MCNC: 3.2 G/DL (ref 3.5–5)
ALP SERPL-CCNC: 71 U/L (ref 38–126)
ANION GAP SERPL CALC-SCNC: 11 MMOL/L (ref 5–19)
AST SERPL-CCNC: 15 U/L (ref 14–36)
BASOPHILS # BLD AUTO: 0.1 10^3/UL (ref 0–0.2)
BASOPHILS NFR BLD AUTO: 0.5 % (ref 0–2)
BILIRUB DIRECT SERPL-MCNC: 0.2 MG/DL (ref 0–0.4)
BILIRUB SERPL-MCNC: 0.5 MG/DL (ref 0.2–1.3)
BUN SERPL-MCNC: 16 MG/DL (ref 7–20)
CALCIUM: 8.8 MG/DL (ref 8.4–10.2)
CHLORIDE SERPL-SCNC: 105 MMOL/L (ref 98–107)
CO2 SERPL-SCNC: 22 MMOL/L (ref 22–30)
EOSINOPHIL # BLD AUTO: 0.3 10^3/UL (ref 0–0.6)
EOSINOPHIL NFR BLD AUTO: 2.2 % (ref 0–6)
ERYTHROCYTE [DISTWIDTH] IN BLOOD BY AUTOMATED COUNT: 15.9 % (ref 11.5–14)
GLUCOSE SERPL-MCNC: 144 MG/DL (ref 75–110)
HCT VFR BLD CALC: 31.3 % (ref 36–47)
HGB BLD-MCNC: 10.3 G/DL (ref 12–15.5)
INR PPP: 1.13
LYMPHOCYTES # BLD AUTO: 1.2 10^3/UL (ref 0.5–4.7)
LYMPHOCYTES NFR BLD AUTO: 9.3 % (ref 13–45)
MCH RBC QN AUTO: 26.3 PG (ref 27–33.4)
MCHC RBC AUTO-ENTMCNC: 33 G/DL (ref 32–36)
MCV RBC AUTO: 80 FL (ref 80–97)
MONOCYTES # BLD AUTO: 1.2 10^3/UL (ref 0.1–1.4)
MONOCYTES NFR BLD AUTO: 8.8 % (ref 3–13)
NEUTROPHILS # BLD AUTO: 10.6 10^3/UL (ref 1.7–8.2)
NEUTS SEG NFR BLD AUTO: 79.2 % (ref 42–78)
PLATELET # BLD: 368 10^3/UL (ref 150–450)
POTASSIUM SERPL-SCNC: 4.1 MMOL/L (ref 3.6–5)
PROT SERPL-MCNC: 6.4 G/DL (ref 6.3–8.2)
PROTHROMBIN TIME: 14.6 SEC (ref 11.4–15.4)
RBC # BLD AUTO: 3.93 10^6/UL (ref 3.72–5.28)
TOTAL CELLS COUNTED % (AUTO): 100 %
WBC # BLD AUTO: 13.4 10^3/UL (ref 4–10.5)

## 2019-10-16 RX ADMIN — PIPERACILLIN AND TAZOBACTAM SCH MLS/HR: 3; .375 INJECTION, POWDER, LYOPHILIZED, FOR SOLUTION INTRAVENOUS; PARENTERAL at 14:00

## 2019-10-16 RX ADMIN — PANTOPRAZOLE SODIUM SCH MG: 40 TABLET, DELAYED RELEASE ORAL at 05:41

## 2019-10-16 RX ADMIN — PANTOPRAZOLE SODIUM SCH MG: 40 TABLET, DELAYED RELEASE ORAL at 17:24

## 2019-10-16 RX ADMIN — ASPIRIN 325 MG ORAL TABLET SCH: 325 PILL ORAL at 09:16

## 2019-10-16 RX ADMIN — COLLAGENASE SANTYL SCH APPLIC: 250 OINTMENT TOPICAL at 17:25

## 2019-10-16 RX ADMIN — VANCOMYCIN HYDROCHLORIDE SCH MLS/HR: 1 INJECTION, POWDER, LYOPHILIZED, FOR SOLUTION INTRAVENOUS at 21:36

## 2019-10-16 RX ADMIN — INSULIN HUMAN SCH: 100 INJECTION, SOLUTION PARENTERAL at 11:44

## 2019-10-16 RX ADMIN — INSULIN HUMAN SCH UNIT: 100 INJECTION, SOLUTION PARENTERAL at 21:35

## 2019-10-16 RX ADMIN — PIPERACILLIN AND TAZOBACTAM SCH MLS/HR: 3; .375 INJECTION, POWDER, LYOPHILIZED, FOR SOLUTION INTRAVENOUS; PARENTERAL at 08:04

## 2019-10-16 RX ADMIN — Medication SCH ML: at 13:41

## 2019-10-16 RX ADMIN — AMLODIPINE BESYLATE SCH MG: 2.5 TABLET ORAL at 09:17

## 2019-10-16 RX ADMIN — COLLAGENASE SANTYL SCH APPLIC: 250 OINTMENT TOPICAL at 09:18

## 2019-10-16 RX ADMIN — PIPERACILLIN AND TAZOBACTAM SCH MLS/HR: 3; .375 INJECTION, POWDER, LYOPHILIZED, FOR SOLUTION INTRAVENOUS; PARENTERAL at 03:57

## 2019-10-16 RX ADMIN — VANCOMYCIN HYDROCHLORIDE SCH MLS/HR: 1 INJECTION, POWDER, LYOPHILIZED, FOR SOLUTION INTRAVENOUS at 05:41

## 2019-10-16 RX ADMIN — ATORVASTATIN CALCIUM SCH MG: 20 TABLET, FILM COATED ORAL at 21:35

## 2019-10-16 RX ADMIN — PIPERACILLIN AND TAZOBACTAM SCH MLS/HR: 3; .375 INJECTION, POWDER, LYOPHILIZED, FOR SOLUTION INTRAVENOUS; PARENTERAL at 21:36

## 2019-10-16 RX ADMIN — INSULIN HUMAN SCH UNIT: 100 INJECTION, SOLUTION PARENTERAL at 17:24

## 2019-10-16 RX ADMIN — VANCOMYCIN HYDROCHLORIDE SCH MLS/HR: 1 INJECTION, POWDER, LYOPHILIZED, FOR SOLUTION INTRAVENOUS at 13:41

## 2019-10-16 RX ADMIN — LOSARTAN POTASSIUM SCH MG: 50 TABLET, FILM COATED ORAL at 21:35

## 2019-10-16 RX ADMIN — APIXABAN SCH: 5 TABLET, FILM COATED ORAL at 09:16

## 2019-10-16 RX ADMIN — APIXABAN SCH: 5 TABLET, FILM COATED ORAL at 21:38

## 2019-10-16 RX ADMIN — INSULIN HUMAN SCH UNIT: 100 INJECTION, SOLUTION PARENTERAL at 08:03

## 2019-10-16 RX ADMIN — Medication SCH ML: at 05:41

## 2019-10-16 NOTE — PDOC TRANSFER SUMMARY
General


Admission Date/PCP: 


  10/10/19 13:44





  MAITE PABLO MD





Resuscitation Status: Full Code





- Transfer Diagnosis


(1) Diabetic infection of left foot


Is this a current diagnosis for this admission?: Yes   


Diagnosis Summary: 


10/10/2019-patient is going to be admitted to [facility] as an inpatient.  To start 

her on IV vancomycin and Zosyn blood cultures wound cultures are requested.  MRI

of the left lower extremity was requested.  ID consult was requested.  Surgical 

consult was requested.  Started on morphine 1 mg IV every 4 as needed.  GI 

prophylaxis initiated.  DVT prophylaxis with heparin 5000 units every 8 hours 

requested.  To start her on insulin sliding scale.  Place a diabetic diet.  

Physical therapy consult on  consult is requested.





10/11/2019-patient admitted with left foot gas gangrene.  Status post removal of

the left great toe and extensive debridement of the wound.  As per the ID 

patient need a long-term IV antibiotic therapy.  No acute events in the last 24 

hours.  Afebrile.  presently on IV vancomycin and Zosyn.





10/12/2019-patient was admitted with a left foot gas gangrene and extensive 

debridement was done left great toe was removed.  ID recommendation is patient n

eed 6 weeks of IV antibiotic therapy.  Wound cultures came back positive for 

Proteus mirabilis and pansensitive.  Blood cultures are negative.  Arterial 

Doppler was requested today.





10/13/2019-status post left great toe removal and extensive debridement for gas 

gangrene of the left foot.  As per ID patient need 6 weeks of IV antibiotic 

therapy.  Wound cultures came back positive for Proteus mirabilis pansensitive. 

Arterial Doppler was requested to check the status of the peripheral vascular di

sease.  Test is pending.





10/14/2019-patient admitted with left foot gas gangrene and diabetic foot ulcers

status post left toe amputation and extensive debridement as per the surgical 

team there is a deterioration of the surgical site wound and patient may need a 

further amputation I spoke to Dr. Higgins this morning because the patient on 

Eliquis he wants to wait at least for 24 to 48 hours for surgical debridement.





10/15/2019-elderly female with history of diabetes mellitus admitted with left 

great toe gangrene he initially had a left great toe amputation and extensive 

debridement patient is going back to the OR again tomorrow.  Eliquis is on hold 

for the last 48 hours.  Blood cultures are positive for Proteus and group B 

streptococcus.  Arterial Doppler of the lower extremity was done report is 

pending.  pt is receiving vancomycin and Zosyn at this time.








10/16/[id number]-xsmu-old female with history of diabetes mellitus type 2, left 

carotid endarterectomy admitted with left foot wound with gangrene status post 

amputation of the left great toe and extensive debridement of the left foot was 

done follow-up next 4 days indicated worsening of the debridement site and the 

plan was to take the patient back to the OR for further debridement.  In the 

meantime we did arterial Doppler it indicates severe vascular disease, CTA of 

the lower extremity was done yesterday it indicates extensive atherosclerosis, 

several areas of focal stenosis in the right superficial femoral artery and 

complete occlusion of the left superficial femoral artery in the mid thigh.  As 

per our surgical recommendations I spoke to Dr. Daniels in [place] he 

agreed to take the patient.  We are waiting for the bed availability at this 

time.  Cultures came back positive for Proteus and group B streptococcus present

on vancomycin and Zosyn.








(2) Fever


Is this a current diagnosis for this admission?: Yes   


Diagnosis Summary: 


10/10/2019-patient came in with fever of 103.  Due to left foot cellulitis.  

Started on vancomycin and Zosyn, blood cultures urine cultures wound cultures 

are requested.





10/11/2019-patient is afebrile T-max is 98.5 fever most likely secondary to left

foot cellulitis with associated possible osteomyelitis and evidence of gas 

gangrene.





10/12/2019-patient came in with a fever of 103 which was resolved now.  T-max is

97.6.





10/15/2019-T-max is 97.8 fever secondary to left foot gangrene resolved.





10/16/2019-T-max is 98.2 today.  Afebrile.  Fever with leukocytosis resolved.








(3) Leukocytosis


Is this a current diagnosis for this admission?: Yes   


Diagnosis Summary: 


10/10/2019-high WBC count most likely secondary to left foot cellulitis with 

underlying possible osteomyelitis.  Started on vancomycin and Zosyn cultures are

requested.  MRI of the left lower extremity was requested to rule out 

osteomyelitis.





10/12/2019-patient came in with elevated WBC count most likely secondary to left

foot cellulitis with gas gangrene and associated osteomyelitis.





10/13/2019-patient came in with elevated WBC count today his WBC count is 10.1 

within normal limits.





10/14/2019-today's labs are pending.


10/16/2019-patient came in with elevated WBC count more than 20,000 at the time 

of admission today's WBC count is 13,400.  Improving.  Patient is afebrile.








(4) New onset atrial fibrillation


Is this a current diagnosis for this admission?: Yes   


Diagnosis Summary: 


10/10/2019-patient found to have new onset atrial fibrillation rate controlled. 

Consult patient with Dr. Ferrera is requested.  Possibly she need Eliquis 

down the line.





10/11/2019-patient came in with new onset atrial fibrillation in the morning 

heart rate is 134.  Patient was given Cardizem IV 10 mg 1 dose and the heart 

rate came down to 70s.  Because of the history of diabetes mellitus, 

hypertension in association with atrial fibrillation patient may need 

anticoagulation at the time of discharge.





10/12/2019-patient came in with new onset atrial fibrillation patient may be n

eeded to be starting on anticoagulation with Eliquis.  


Surgical team is not planning for any procedures we can start her on Eliquis.





10/13/2019-heart rate is in the 50s.  Not on beta-blockers.  Was started on 

Eliquis.  Dr. Ferrera is planning to arrange for stress test tomorrow.





10/14/2019-patient has bifascicular heart block-RBBB plus left anterior 

fascicular block.  Heart rate is around 50s.  Eliquis will be on hold.  Plan is 

to continue amlodipine 9 and Cozaar at this time.  Maisha plans to do the 

Lexiscan stress test as an outpatient.





10/15/2019-patient found to have a new onset atrial fibrillation and 

bifascicular block, Eliquis on hold for possible surgery tomorrow.





10/16/2019-patient found to have new onset atrial fibrillation during this 

hospital admission she was started on Eliquis later on it was discontinued 

because of the further possible need for surgical debridements of the left foot.

 Patient EKG also found to have bifascicular block.  Cardiology on board.








(5) HTN (hypertension)


Is this a current diagnosis for this admission?: No   


Diagnosis Summary: 


Patient has history of chronic essential hypertension.  She is on Cozaar at 

home.  Plan is to resume the medication during the hospital stay.  Latest blood 

pressure is 151/60.











10/11/2019-patient blood pressure is 123/53.  Plan to continue IV fluids.





10/12/2019-patient blood pressure today is 139/50.  Plan is to discontinue  IV 

fluids from today.





10/13/2019-blood pressure today is 120/45.  Stable.  Patient is off the IV 

fluids.  Plan is to continue the present management.





10/14/2019-blood pressure today is 145/58.  Stable.  Plan is to continue Cozaar 

and amlodipine.





10/16/2019-patient blood pressure today is 126/56.  Stable.  Presently on Cozaar

and amlodipine plan is to continue the present management.








(6) Obesity (BMI 30.0-34.9)


Is this a current diagnosis for this admission?: No   





- Transfer Medications


Home Medications: 


                                        





Aspirin [Aspirin 325 mg Tablet] 325 mg PO DAILY 10/10/19 


Empagliflozin [Jardiance] 10 mg PO DAILY 10/10/19 


Insulin Aspart [Novolog Flexpen] 0 units SQ .PERSLIDINGSCALE 10/10/19 


Insulin Degludec [Tresiba Flextouch U-200] 45 units SQ DAILY 10/10/19 


Losartan Potassium [Cozaar 25 mg Tablet] 25 mg PO DAILY 10/10/19 


Metformin HCl [Glucophage] 1,000 mg PO WBRKFST 10/10/19 


Saxagliptin HCl/Metformin HCl [Kombiglyze Xr 5-1,000 mg Tab] 1 tab PO WSUPPER 

10/10/19 


Simvastatin [Zocor 80 mg Tablet] 80 mg PO QHS 10/10/19 








Transfer Medications: 


                               Current Medications





Acetaminophen (Tylenol 325 Mg Tablet)  650 mg PO Q4HP PRN


   PRN Reason: FOR BACK PAIN


   Stop: 11/10/19 12:12


   Last Admin: 10/14/19 21:51 Dose:  650 mg


   Documented by: 


Amlodipine Besylate (Norvasc 2.5 Mg Tablet)  2.5 mg PO DAILY ABEBA


   Stop: 11/13/19 09:59


   Last Admin: 10/16/19 09:17 Dose:  2.5 mg


   Documented by: 


Apixaban (Eliquis 5 Mg Tablet)  5 mg PO Q12 ABEBA


   Stop: 11/12/19 09:59


   Last Admin: 10/16/19 09:16 Dose:  Not Given


   Documented by: 


Aspirin (Aspirin 325 Mg Tablet)  325 mg PO DAILY [facility]


   Stop: 11/10/19 09:59


   Last Admin: 10/16/19 09:16 Dose:  Not Given


   Documented by: 


Atorvastatin Calcium (Lipitor 20 Mg Tablet)  20 mg PO QHS [facility]


   Stop: 11/12/19 21:59


   Last Admin: 10/15/19 21:20 Dose:  20 mg


   Documented by: 


Collagenase (Santyl Ointment 30 Gm)  1 applic TP BID [facility]


   Stop: 11/11/19 17:59


   Last Admin: 10/16/19 09:18 Dose:  1 applic


   Documented by: 


Dextrose (Dextrose Inj 50% Syringe (25 Gm/50 Ml))  12.5 gm IV PRN PRN; Protocol


   PRN Reason: FOR BG 50-69 IN ALERT PATIENT


   Stop: 11/09/19 14:39


Dextrose (Dextrose Inj 50% Syringe (25 Gm/50 Ml))  25 gm IV PRN PRN; Protocol


   PRN Reason: PER PROTOCOL


   Stop: 11/09/19 14:39


Glucagon (Glucagen Inj 1 Mg Vial)  1 mg IM PRN PRN; Protocol


   PRN Reason: Evaluate for BG < 70


   Stop: 11/09/19 14:39


Glucose (Glutose 40% Gel 15 Gm Tube)  15 gm PO PRN PRN; Protocol


   PRN Reason: FOR BG 50-69 IN ALERT PATIENT


   Stop: 11/09/19 14:39


Glucose (Glutose 40% Gel 15 Gm Tube)  30 gm PO PRN PRN; Protocol


   PRN Reason: FOR BG < 50 IN ALERT PATIENT 


   Stop: 11/09/19 14:39


Piperacillin Sod/Tazobactam (Sod 3.375 gm/ Sodium Chloride)  100 mls @ 200 

mls/hr IV Q6A [facility]


   Stop: 10/17/19 15:59


   Last Infusion: 10/16/19 09:17 Dose:  Infused


   Documented by: 


Vancomycin HCl 750 mg/ (Dextrose)  250 mls @ 166.667 mls/hr IV Q8 [facility]


   Stop: 10/17/19 13:59


   Last Infusion: 10/16/19 08:05 Dose:  Infused


   Documented by: 


Insulin Human Regular (Humulin R (Pyxis) Insulin 100 Unit/Ml 3ml)  0 - 12 unit 

SUBCUT [facility]; Protocol


   Stop: 11/09/19 15:59


   Last Admin: 10/16/19 08:03 Dose:  2 unit


   Documented by: 


Losartan Potassium (Cozaar 50 Mg Tablet)  50 mg PO QHS [facility]


   Stop: 11/10/19 21:59


   Last Admin: 10/15/19 21:18 Dose:  Not Given


   Documented by: 


Pantoprazole Sodium (Protonix 40 Mg Dr Tablet)  40 mg PO BID@0600,1700 [facility]


   Stop: 11/09/19 16:59


   Last Admin: 10/16/19 05:41 Dose:  40 mg


   Documented by: 


Empagliflozin [ Jardiance] 10 Mg Tablet  1 dose PO DAILY ABEBA


   Stop: 11/10/19 21:59


   Last Admin: 10/16/19 09:17 Dose:  1 tab


   Documented by: 


Sodium Chloride (Saline Flush 2.5 Ml Monoject Prefil Syrin)  2.5 ml IV Q8 [facility]


   Stop: 11/09/19 21:59


   Last Admin: 10/16/19 05:41 Dose:  2.5 ml


   Documented by: 











- Allergies


Allergies/Adverse Reactions: 


                                        





glipizide [From Glucotrol] Allergy (Verified 10/10/19 10:43)


   











Hospital Course


Hospital Course: 





Aggressive left foot debridement including complete amputation of left great 

toe, and webspace skin, soft tissue and tendons





64 year old female history of type 2 diabetes mellitus, hypertension, 

hypercholesteremia, left carotid endarterectomy came to the emergency room with 

complaints of left lower leg/left foot erythema and redness and ulceration.  She

went to see her pediatrician for callus removal as an outpatient and minor 

procedure was done on the left foot started having the redness increasing pain 

from yesterday decided to came to the emergency room for further evaluation.  

She has a fever of 103 in the emergency room with a WBC count of 20,000.  X-rays

done this morning with a different account shows gas formation in the right 

foot.  Consultation with Dr. Bailey is requested and also found to have new 

onset atrial fibrillation in the emergency room consultation with Dr. Ferrera

as requested.








10/11/[id number]-uadx-old female admitted with diabetic foot ulcer and gas gangrene

involving the left foot.  Status post removal of left great toe and extensive 

debridement of the wound.





10/12/2019-patient is comfortable in the bed communicating well.  Eating her 

breakfast.  No complaints.  The wound cultures came back positive for Proteus 

pansensitive.





10/13/2019-no acute events in the last 24 hours.  Patient was started on Eliquis

yesterday for atrial fibrillation.  Surgical follow-up was done Dr. Cotton spoke

to me his impression is patient may need a minor debridement other than that no 

surgical procedure was planned.  Patient is comfortable in the bed communicating

well.  Heart rate in the 50s dropping down to 40s last night.  Patient is not on

beta-blockers.  She is scheduled for stress test tomorrow.  Dr. Ferrera is on

board.





10/14/2019-no acute events in last 24 hours.  Afebrile.  As per the surgical 

team the surgical site looks worsening deteriorated patient may need further 

amputation.  I spoke to Dr. Higgins this morning ,,because the patient is on 

Eliquis  he wants to wait until Wednesday.





10/15/2019-no acute events in the last 24 hours.  Patient is afebrile.  Surgery 

plan to do further debridements tomorrow Eliquis is on hold.  No acute events in

the last 24 hours.  Patient has bifascicular heart block Dr. Ferrera is on 

board.  Cultures came back positive for Proteus and group B strep.








10/16/2019-no acute events in the last 24 hours.  Patient is afebrile.  CT of 

the lower extremity came back positive for complete occlusion of the left 

superficial femoral artery in the mid thigh.  Also found to have a several areas

of focal stenosis in the right superficial femoral artery.  Case was discussed 

with Dr. Daniels in [place] he accepted the patient but we are waiting 

for the bed availability now.





Physical Exam


Vital Signs: 


                                        











Temp Pulse Resp BP Pulse Ox


 


 97.8 F   49 L  16   134/41 H  98 


 


 10/16/19 07:47  10/16/19 07:47  10/16/19 07:47  10/16/19 07:47  10/16/19 07:47








                                 Intake & Output











 10/15/19 10/16/19 10/17/19





 06:59 06:59 06:59


 


Intake Total 1620 2110 350


 


Output Total  950 


 


Balance 1620 1160 350


 


Weight 84.2 kg 84.3 kg 











General appearance: PRESENT: no acute distress, obese


Head exam: PRESENT: atraumatic


Eye exam: PRESENT: PERRLA


Mouth exam: PRESENT: moist, tongue midline


Teeth exam: PRESENT: poor dentation


Neck exam: ABSENT: carotid bruit, JVD, lymphadenopathy, thyromegaly


Respiratory exam: PRESENT: decreased breath sounds


Cardiovascular exam: PRESENT: RRR.  ABSENT: diastolic murmur, rubs, systolic 

murmur


GI/Abdominal exam: PRESENT: normal bowel sounds, soft.  ABSENT: distended, 

guarding, mass, organolmegaly, rebound, tenderness


Rectal exam: PRESENT: deferred


Extremities exam: PRESENT: other - Left foot is wrapped in dressing.


Neurological exam: PRESENT: alert, awake, oriented to person, oriented to place,

oriented to time, oriented to situation, CN II-XII grossly intact.  ABSENT: m

otor sensory deficit


Psychiatric exam: PRESENT: appropriate affect, normal mood.  ABSENT: homicidal 

ideation, suicidal ideation





Results


Laboratory Results: 


                                        





                                 10/16/19 06:33 





                                 10/16/19 06:33 





                                        











  10/16/19 10/16/19





  06:33 06:33


 


WBC  13.4 H 


 


RBC  3.93 


 


Hgb  10.3 L 


 


Hct  31.3 L 


 


MCV  80 


 


MCH  26.3 L 


 


MCHC  33.0 


 


RDW  15.9 H 


 


Plt Count  368 


 


Seg Neutrophils %  79.2 H 


 


Sodium   137.9


 


Potassium   4.1


 


Chloride   105


 


Carbon Dioxide   22


 


Anion Gap   11


 


BUN   16


 


Creatinine   0.88


 


Est GFR (African Amer)   > 60


 


Glucose   144 H


 


Calcium   8.8


 


Magnesium   2.1


 


Total Bilirubin   0.5


 


AST   15


 


Alkaline Phosphatase   71


 


Total Protein   6.4


 


Albumin   3.2 L








                                        





10/10/19 13:27   Blood   Blood Culture - Final


                            NO GROWTH IN 5 DAYS


10/10/19 11:55   Blood   Blood Culture - Final


                            NO GROWTH IN 5 DAYS





                                        











  10/10/19 10/10/19 10/11/19





  21:38 21:38 03:39


 


Creatine Kinase  80   88


 


CK-MB (CK-2)   0.56 


 


Troponin I   0.032 


 


NT-Pro-B Natriuret Pep   














  10/11/19 10/11/19 10/11/19





  03:39 09:35 09:35


 


Creatine Kinase   91 


 


CK-MB (CK-2)  0.90   0.92


 


Troponin I  0.021   0.016


 


NT-Pro-B Natriuret Pep  2750 H  











Impressions: 


                                        





Chest X-Ray  10/10/19 14:34


IMPRESSION:  NO ACUTE RADIOGRAPHIC FINDING IN THE CHEST.


 








Aorta w/Runoff CTA  10/15/19 00:00


IMPRESSION:  EXTENSIVE ATHEROSCLEROSIS.  SEVERAL FOCAL AREAS OF STENOSIS IN THE 

RIGHT SUPERFICIAL FEMORAL ARTERY WITHOUT OCCLUSION.  COMPLETE OCCLUSION OF THE 

LEFT SUPERFICIAL FEMORAL ARTERY IN THE MID THIGH WITH RECONSTITUTION VIA 

COLLATERALS DISTALLY.


 














Plan


Discharge Plan: 





She is going to [place] today when the bed is available.


Time Spent: Greater than 30 Minutes

## 2019-10-17 VITALS — SYSTOLIC BLOOD PRESSURE: 150 MMHG | DIASTOLIC BLOOD PRESSURE: 48 MMHG

## 2019-10-17 RX ADMIN — ASPIRIN 325 MG ORAL TABLET SCH: 325 PILL ORAL at 09:26

## 2019-10-17 RX ADMIN — APIXABAN SCH: 5 TABLET, FILM COATED ORAL at 09:24

## 2019-10-17 RX ADMIN — PANTOPRAZOLE SODIUM SCH MG: 40 TABLET, DELAYED RELEASE ORAL at 16:16

## 2019-10-17 RX ADMIN — VANCOMYCIN HYDROCHLORIDE SCH MLS/HR: 1 INJECTION, POWDER, LYOPHILIZED, FOR SOLUTION INTRAVENOUS at 04:59

## 2019-10-17 RX ADMIN — Medication SCH: at 03:39

## 2019-10-17 RX ADMIN — PIPERACILLIN AND TAZOBACTAM SCH MLS/HR: 3; .375 INJECTION, POWDER, LYOPHILIZED, FOR SOLUTION INTRAVENOUS; PARENTERAL at 10:57

## 2019-10-17 RX ADMIN — AMLODIPINE BESYLATE SCH MG: 2.5 TABLET ORAL at 09:23

## 2019-10-17 RX ADMIN — INSULIN HUMAN SCH UNIT: 100 INJECTION, SOLUTION PARENTERAL at 12:11

## 2019-10-17 RX ADMIN — PANTOPRAZOLE SODIUM SCH MG: 40 TABLET, DELAYED RELEASE ORAL at 04:59

## 2019-10-17 RX ADMIN — PIPERACILLIN AND TAZOBACTAM SCH MLS/HR: 3; .375 INJECTION, POWDER, LYOPHILIZED, FOR SOLUTION INTRAVENOUS; PARENTERAL at 16:16

## 2019-10-17 RX ADMIN — PIPERACILLIN AND TAZOBACTAM SCH MLS/HR: 3; .375 INJECTION, POWDER, LYOPHILIZED, FOR SOLUTION INTRAVENOUS; PARENTERAL at 03:42

## 2019-10-17 RX ADMIN — VANCOMYCIN HYDROCHLORIDE SCH MLS/HR: 1 INJECTION, POWDER, LYOPHILIZED, FOR SOLUTION INTRAVENOUS at 14:38

## 2019-10-17 RX ADMIN — Medication SCH ML: at 14:38

## 2019-10-17 RX ADMIN — COLLAGENASE SANTYL SCH APPLIC: 250 OINTMENT TOPICAL at 10:57

## 2019-10-17 RX ADMIN — Medication SCH ML: at 04:59

## 2019-10-17 RX ADMIN — INSULIN HUMAN SCH UNIT: 100 INJECTION, SOLUTION PARENTERAL at 16:16

## 2019-10-17 RX ADMIN — INSULIN HUMAN SCH: 100 INJECTION, SOLUTION PARENTERAL at 08:52

## 2019-10-17 NOTE — PDOC PROGRESS REPORT
Subjective


Progress Note for:: 10/17/19


Subjective:: 





Aggressive left foot debridement including complete amputation of left great 

toe, and webspace skin, soft tissue and tendons





64 year old female history of type 2 diabetes mellitus, hypertension, 

hypercholesteremia, left carotid endarterectomy came to the emergency room with 

complaints of left lower leg/left foot erythema and redness and ulceration.  She

went to see her pediatrician for callus removal as an outpatient and minor 

procedure was done on the left foot started having the redness increasing pain 

from yesterday decided to came to the emergency room for further evaluation.  

She has a fever of 103 in the emergency room with a WBC count of 20,000.  X-rays

done this morning with a different account shows gas formation in the right 

foot.  Consultation with Dr. Bailey is requested and also found to have new 

onset atrial fibrillation in the emergency room consultation with Dr. Ferrera

as requested.








10/11/[id number]-wxaj-old female admitted with diabetic foot ulcer and gas gangrene

involving the left foot.  Status post removal of left great toe and extensive 

debridement of the wound.





10/12/2019-patient is comfortable in the bed communicating well.  Eating her 

breakfast.  No complaints.  The wound cultures came back positive for Proteus 

pansensitive.





10/13/2019-no acute events in the last 24 hours.  Patient was started on Eliquis

yesterday for atrial fibrillation.  Surgical follow-up was done Dr. Cotton spoke

to me his impression is patient may need a minor debridement other than that no 

surgical procedure was planned.  Patient is comfortable in the bed communicating

well.  Heart rate in the 50s dropping down to 40s last night.  Patient is not on

beta-blockers.  She is scheduled for stress test tomorrow.  Dr. Ferrera is on

board.





10/14/2019-no acute events in last 24 hours.  Afebrile.  As per the surgical 

team the surgical site looks worsening deteriorated patient may need further 

amputation.  I spoke to Dr. Higgins this morning ,,because the patient is on 

Eliquis  he wants to wait until Wednesday.





10/15/2019-no acute events in the last 24 hours.  Patient is afebrile.  Surgery 

plan to do further debridements tomorrow Eliquis is on hold.  No acute events in

the last 24 hours.  Patient has bifascicular heart block Dr. Ferrera is on 

board.  Cultures came back positive for Proteus and group B strep.








10/17/2019-patient was accepted to [facility] by Dr. Daniels be 

waiting for the bed availability.  The no acute events in the last 24 hours.  

Afebrile.  Patient is receiving IV vancomycin and Zosyn.


Reason For Visit: 


DIABETIC INFECTION OF LEFT FOOT,LEUKOCYTOSIS,FEVER








Physical Exam


Vital Signs: 


                                        











Temp Pulse Resp BP Pulse Ox


 


 97.7 F   52 L  17   126/46 H  93 


 


 10/17/19 07:19  10/17/19 07:19  10/17/19 07:19  10/17/19 07:19  10/17/19 07:19








                                 Intake & Output











 10/16/19 10/17/19 10/18/19





 06:59 06:59 06:59


 


Intake Total 2110 1990 250


 


Output Total 950 550 


 


Balance 1160 1440 250


 


Weight 84.3 kg 84.9 kg 











General appearance: PRESENT: no acute distress


Head exam: PRESENT: atraumatic


Eye exam: PRESENT: PERRLA


Mouth exam: PRESENT: moist, tongue midline


Teeth exam: PRESENT: poor dentation


Neck exam: ABSENT: carotid bruit, JVD, lymphadenopathy, thyromegaly


Respiratory exam: PRESENT: decreased breath sounds


Cardiovascular exam: PRESENT: RRR.  ABSENT: diastolic murmur, rubs, systolic 

murmur


GI/Abdominal exam: PRESENT: normal bowel sounds, soft.  ABSENT: distended, 

guarding, mass, organolmegaly, rebound, tenderness


Rectal exam: PRESENT: deferred


Extremities exam: PRESENT: full ROM, other - Left foot wrapped in bandage..  

ABSENT: calf tenderness, clubbing, pedal edema


Neurological exam: PRESENT: alert, awake, oriented to person, oriented to place,

oriented to time, oriented to situation, CN II-XII grossly intact.  ABSENT: 

motor sensory deficit


Psychiatric exam: PRESENT: appropriate affect, normal mood.  ABSENT: homicidal 

ideation, suicidal ideation





Results


Laboratory Results: 


                                        





                                 10/16/19 06:33 





                                 10/16/19 06:33 





                                        











  10/10/19 10/10/19 10/11/19





  21:38 21:38 03:39


 


Creatine Kinase  80   88


 


CK-MB (CK-2)   0.56 


 


Troponin I   0.032 


 


NT-Pro-B Natriuret Pep   














  10/11/19 10/11/19 10/11/19





  03:39 09:35 09:35


 


Creatine Kinase   91 


 


CK-MB (CK-2)  0.90   0.92


 


Troponin I  0.021   0.016


 


NT-Pro-B Natriuret Pep  2750 H  











Impressions: 


                                        





Chest X-Ray  10/10/19 14:34


IMPRESSION:  NO ACUTE RADIOGRAPHIC FINDING IN THE CHEST.


 








Aorta w/Runoff CTA  10/15/19 00:00


IMPRESSION:  EXTENSIVE ATHEROSCLEROSIS.  SEVERAL FOCAL AREAS OF STENOSIS IN THE 

RIGHT SUPERFICIAL FEMORAL ARTERY WITHOUT OCCLUSION.  COMPLETE OCCLUSION OF THE 

LEFT SUPERFICIAL FEMORAL ARTERY IN THE MID THIGH WITH RECONSTITUTION VIA 

COLLATERALS DISTALLY.


 














Assessment and Plan





- Diagnosis


(1) Diabetic infection of left foot


Is this a current diagnosis for this admission?: Yes   


Plan: 


10/10/2019-patient is going to be admitted to [facility] as an inpatient.  To start he

r on IV vancomycin and Zosyn blood cultures wound cultures are requested.  MRI 

of the left lower extremity was requested.  ID consult was requested.  Surgical 

consult was requested.  Started on morphine 1 mg IV every 4 as needed.  GI 

prophylaxis initiated.  DVT prophylaxis with heparin 5000 units every 8 hours 

requested.  To start her on insulin sliding scale.  Place a diabetic diet.  

Physical therapy consult on  consult is requested.





10/11/2019-patient admitted with left foot gas gangrene.  Status post removal of

the left great toe and extensive debridement of the wound.  As per the ID 

patient need a long-term IV antibiotic therapy.  No acute events in the last 24 

hours.  Afebrile.  presently on IV vancomycin and Zosyn.





10/12/2019-patient was admitted with a left foot gas gangrene and extensive 

debridement was done left great toe was removed.  ID recommendation is patient 

need 6 weeks of IV antibiotic therapy.  Wound cultures came back positive for 

Proteus mirabilis and pansensitive.  Blood cultures are negative.  Arterial 

Doppler was requested today.





10/13/2019-status post left great toe removal and extensive debridement for gas 

gangrene of the left foot.  As per ID patient need 6 weeks of IV antibiotic 

therapy.  Wound cultures came back positive for Proteus mirabilis pansensitive. 

Arterial Doppler was requested to check the status of the peripheral vascular 

disease.  Test is pending.





10/14/2019-patient admitted with left foot gas gangrene and diabetic foot ulcers

status post left toe amputation and extensive debridement as per the surgical 

team there is a deterioration of the surgical site wound and patient may need a 

further amputation I spoke to Dr. Higgins this morning because the patient on 

Eliquis he wants to wait at least for 24 to 48 hours for surgical debridement.





10/15/2019-elderly female with history of diabetes mellitus admitted with left 

great toe gangrene he initially had a left great toe amputation and extensive 

debridement patient is going back to the OR again tomorrow.  Eliquis is on hold 

for the last 48 hours.  Blood cultures are positive for Proteus and group B 

streptococcus.  Arterial Doppler of the lower extremity was done report is 

pending.  pt is receiving vancomycin and Zosyn at this time.





10/17/2019-patient admitted with left foot gas gangrene status post left great 

toe amputation and extensive debridement arterial Doppler was done shows 

extensive atherosclerotic vascular disease CT of the lower extremity was done 

found to have complete occlusion of the superficial femoral artery in the left 

mid thigh.  Spoke to Dr. Daniels he agreed to take the patient to [place]

unfortunately no beds are available there.  Was a bed is available she will go 

to [facility].








(2) Fever


Qualifiers: 


   Fever type: unspecified   Qualified Code(s): R50.9 - Fever, unspecified   


Is this a current diagnosis for this admission?: Yes   





(3) Leukocytosis


Qualifiers: 


   Leukocytosis type: bandemia   Qualified Code(s): D72.825 - Bandemia   


Is this a current diagnosis for this admission?: Yes   





(4) New onset atrial fibrillation


Is this a current diagnosis for this admission?: Yes   


Plan: 


10/10/2019-patient found to have new onset atrial fibrillation rate controlled. 

Consult patient with Dr. Ferrera is requested.  Possibly she need Eliquis 

down the line.





10/11/2019-patient came in with new onset atrial fibrillation in the morning 

heart rate is 134.  Patient was given Cardizem IV 10 mg 1 dose and the heart 

rate came down to 70s.  Because of the history of diabetes mellitus, 

hypertension in association with atrial fibrillation patient may need antico

agulation at the time of discharge.





10/12/2019-patient came in with new onset atrial fibrillation patient may be 

needed to be starting on anticoagulation with Eliquis.  


Surgical team is not planning for any procedures we can start her on Eliquis.





10/13/2019-heart rate is in the 50s.  Not on beta-blockers.  Was started on 

Eliquis.  Dr. Ferrera is planning to arrange for stress test tomorrow.





10/14/2019-patient has bifascicular heart block-RBBB plus left anterior 

fascicular block.  Heart rate is around 50s.  Eliquis will be on hold.  Plan is 

to continue amlodipine 9 and Cozaar at this time.  Maisha plans to do the 

Lexiscan stress test as an outpatient.





10/15/2019-patient found to have a new onset atrial fibrillation and 

bifascicular block, Eliquis on hold for possible surgery tomorrow.





10/17/2019-patient found to have new onset atrial fibrillation started on 

Eliquis.  Because of the possibility of surgery now Eliquis is on hold.  Patient

also has a bifascicular block.








(5) HTN (hypertension)


Is this a current diagnosis for this admission?: No   


Plan: 


Patient has history of chronic essential hypertension.  She is on Cozaar at 

home.  Plan is to resume the medication during the hospital stay.  Latest blood 

pressure is 151/60.











10/11/2019-patient blood pressure is 123/53.  Plan to continue IV fluids.





10/12/2019-patient blood pressure today is 139/50.  Plan is to discontinue  IV 

fluids from today.





10/13/2019-blood pressure today is 120/45.  Stable.  Patient is off the IV 

fluids.  Plan is to continue the present management.





10/14/2019-blood pressure today is 145/58.  Stable.  Plan is to continue Cozaar 

and amlodipine.





10/17/2019-patient blood pressure today is 129/67.  Stable.  Plan is to continue

Cozaar and amlodipine.








(6) Obesity (BMI 30.0-34.9)


Is this a current diagnosis for this admission?: No   





- Time


Time Spent with patient: 25-34 minutes


Medications reviewed and adjusted accordingly: Yes


Anticipated discharge: [facility]

## 2019-10-17 NOTE — PROGRESS NOTE
Provider Note


Provider Note: 


ID consult follow up note





Pt not seen or examined. Reviewed patient's chart. Pt is a 64 year old woman 

with PMH including obesity, HTN, DM type II, HLD, PVD. She was admitted with L 

foot gas gangrene. She was taken for operative debridement of the left foot on 

10/10/19 with amputation of the 1st toe. Per the operative note, all of the 

underlying tissue of the 1st toe was encrotic, gray to black with pus, and the 

plantar ulcer was excised as well as amputation fo the first metatarsal head. 

There was concern that she may require further amputation. The surgical 

pathology report of the submitted specimen showed acute osteomyelitis and viable

tissue with acute inflammation at the surgical margins. The operative culture 

yielded growth of Group B Strep and Proteus mirabilis (susceptible to all agents

reported except tetracycline). The patient had improvement in systemic 

derrangements since source control was achieved. Toe stump site was noted to be 

red but dry and popliteal or ankle pulses not palpable on the left side. Her 

arterial duplex scan of the left leg showed severe proximal occlusive disease, 

and the patient is awaiting transfer to [facility] for Vascular Surgery 

evaluation.





Impression/Recommendations


Polymicrobial diabetic foot infection, left foot, with osteomyelitis and 

gangrene, s/p amputation of 1st ray


- With high suspicion of residual infected bone, a prolonged course of 

antibiotics may be required. With only growth of Group B Strep and Proteus 

mirabilis from the operative cultures, targeting antimicrobial therapy to these 

organisms is appropriate at present time. 


- Recommend discontinuing IV vancomycin and Zosyn


- Can treat with 2 grams q8h IV cefazolin or Rocephin 2 grams IV daily. Although

cefazolin requires more frequent dosing, it is a little bit narrower in spectrum

and may be associated with a little bit less of an increase in C difficile risk 

than Rocephin. If the patient does not have definitive surgical resection of all

involved bone and can have revascularization and attempted salvage of the foot, 

at the point at which she is appropriate for discharge home, Rocephin 2 grams IV

daily to finish the remainder of 6 weeks of treatment would be a more convenient

option than three times a day cefazolin. If she is discharged with IV ant

ibiotics, monitoring CBC and CMP once weekly would be advisable, as well as 

removal of PICC line once antibiotic course has been completed. 





Monster Hi MD


[facility] Infectious Diseases


pager [phone]